# Patient Record
Sex: MALE | Race: WHITE | NOT HISPANIC OR LATINO | Employment: OTHER | URBAN - METROPOLITAN AREA
[De-identification: names, ages, dates, MRNs, and addresses within clinical notes are randomized per-mention and may not be internally consistent; named-entity substitution may affect disease eponyms.]

---

## 2017-05-04 ENCOUNTER — HOSPITAL ENCOUNTER (EMERGENCY)
Facility: HOSPITAL | Age: 67
Discharge: HOME | End: 2017-05-04
Attending: EMERGENCY MEDICINE | Admitting: EMERGENCY MEDICINE
Payer: MEDICARE

## 2017-05-04 ENCOUNTER — APPOINTMENT (EMERGENCY)
Dept: RADIOLOGY | Facility: HOSPITAL | Age: 67
End: 2017-05-04
Payer: MEDICARE

## 2017-05-04 VITALS
OXYGEN SATURATION: 98 % | HEART RATE: 61 BPM | RESPIRATION RATE: 14 BRPM | SYSTOLIC BLOOD PRESSURE: 150 MMHG | TEMPERATURE: 97.6 F | WEIGHT: 190 LBS | DIASTOLIC BLOOD PRESSURE: 91 MMHG | BODY MASS INDEX: 28.79 KG/M2 | HEIGHT: 68 IN

## 2017-05-04 DIAGNOSIS — R07.9 NONSPECIFIC CHEST PAIN: Primary | ICD-10-CM

## 2017-05-04 DIAGNOSIS — K29.70 GASTRITIS: ICD-10-CM

## 2017-05-04 LAB
ALBUMIN SERPL BCP-MCNC: 3.9 G/DL (ref 3.5–5)
ALP SERPL-CCNC: 68 U/L (ref 46–116)
ALT SERPL W P-5'-P-CCNC: 43 U/L (ref 12–78)
ANION GAP SERPL CALCULATED.3IONS-SCNC: 10 MMOL/L (ref 4–13)
APTT PPP: 32 SECONDS (ref 23–35)
AST SERPL W P-5'-P-CCNC: 18 U/L (ref 5–45)
ATRIAL RATE: 72 BPM
BASOPHILS # BLD AUTO: 0.03 THOUSANDS/ΜL (ref 0–0.1)
BASOPHILS NFR BLD AUTO: 0 % (ref 0–1)
BILIRUB SERPL-MCNC: 0.4 MG/DL (ref 0.2–1)
BUN SERPL-MCNC: 26 MG/DL (ref 5–25)
CALCIUM SERPL-MCNC: 9 MG/DL (ref 8.3–10.1)
CHLORIDE SERPL-SCNC: 106 MMOL/L (ref 100–108)
CO2 SERPL-SCNC: 27 MMOL/L (ref 21–32)
CREAT SERPL-MCNC: 0.89 MG/DL (ref 0.6–1.3)
EOSINOPHIL # BLD AUTO: 0.18 THOUSAND/ΜL (ref 0–0.61)
EOSINOPHIL NFR BLD AUTO: 2 % (ref 0–6)
ERYTHROCYTE [DISTWIDTH] IN BLOOD BY AUTOMATED COUNT: 12.5 % (ref 11.6–15.1)
GFR SERPL CREATININE-BSD FRML MDRD: >60 ML/MIN/1.73SQ M
GLUCOSE SERPL-MCNC: 122 MG/DL (ref 65–140)
HCT VFR BLD AUTO: 43.2 % (ref 36.5–49.3)
HGB BLD-MCNC: 14.6 G/DL (ref 12–17)
INR PPP: 0.99 (ref 0.86–1.16)
LIPASE SERPL-CCNC: 115 U/L (ref 73–393)
LYMPHOCYTES # BLD AUTO: 1.72 THOUSANDS/ΜL (ref 0.6–4.47)
LYMPHOCYTES NFR BLD AUTO: 21 % (ref 14–44)
MCH RBC QN AUTO: 29.9 PG (ref 26.8–34.3)
MCHC RBC AUTO-ENTMCNC: 33.8 G/DL (ref 31.4–37.4)
MCV RBC AUTO: 89 FL (ref 82–98)
MONOCYTES # BLD AUTO: 0.64 THOUSAND/ΜL (ref 0.17–1.22)
MONOCYTES NFR BLD AUTO: 8 % (ref 4–12)
NEUTROPHILS # BLD AUTO: 5.77 THOUSANDS/ΜL (ref 1.85–7.62)
NEUTS SEG NFR BLD AUTO: 69 % (ref 43–75)
NRBC BLD AUTO-RTO: 0 /100 WBCS
P AXIS: 62 DEGREES
PLATELET # BLD AUTO: 242 THOUSANDS/UL (ref 149–390)
PMV BLD AUTO: 10 FL (ref 8.9–12.7)
POTASSIUM SERPL-SCNC: 3.6 MMOL/L (ref 3.5–5.3)
PR INTERVAL: 190 MS
PROT SERPL-MCNC: 7 G/DL (ref 6.4–8.2)
PROTHROMBIN TIME: 13.3 SECONDS (ref 12.1–14.4)
QRS AXIS: 36 DEGREES
QRSD INTERVAL: 88 MS
QT INTERVAL: 396 MS
QTC INTERVAL: 433 MS
RBC # BLD AUTO: 4.88 MILLION/UL (ref 3.88–5.62)
SODIUM SERPL-SCNC: 143 MMOL/L (ref 136–145)
SPECIMEN SOURCE: NORMAL
SPECIMEN SOURCE: NORMAL
T WAVE AXIS: 46 DEGREES
TROPONIN I BLD-MCNC: 0 NG/ML (ref 0–0.08)
TROPONIN I BLD-MCNC: 0.01 NG/ML (ref 0–0.08)
VENTRICULAR RATE: 72 BPM
WBC # BLD AUTO: 8.36 THOUSAND/UL (ref 4.31–10.16)

## 2017-05-04 PROCEDURE — 36415 COLL VENOUS BLD VENIPUNCTURE: CPT | Performed by: EMERGENCY MEDICINE

## 2017-05-04 PROCEDURE — 83690 ASSAY OF LIPASE: CPT | Performed by: EMERGENCY MEDICINE

## 2017-05-04 PROCEDURE — 85730 THROMBOPLASTIN TIME PARTIAL: CPT | Performed by: EMERGENCY MEDICINE

## 2017-05-04 PROCEDURE — 71020 HB CHEST X-RAY 2VW FRONTAL&LATL: CPT

## 2017-05-04 PROCEDURE — 85025 COMPLETE CBC W/AUTO DIFF WBC: CPT | Performed by: EMERGENCY MEDICINE

## 2017-05-04 PROCEDURE — 85610 PROTHROMBIN TIME: CPT | Performed by: EMERGENCY MEDICINE

## 2017-05-04 PROCEDURE — 99285 EMERGENCY DEPT VISIT HI MDM: CPT

## 2017-05-04 PROCEDURE — 84484 ASSAY OF TROPONIN QUANT: CPT

## 2017-05-04 PROCEDURE — 96374 THER/PROPH/DIAG INJ IV PUSH: CPT

## 2017-05-04 PROCEDURE — 96361 HYDRATE IV INFUSION ADD-ON: CPT

## 2017-05-04 PROCEDURE — 93005 ELECTROCARDIOGRAM TRACING: CPT | Performed by: EMERGENCY MEDICINE

## 2017-05-04 PROCEDURE — 80053 COMPREHEN METABOLIC PANEL: CPT | Performed by: EMERGENCY MEDICINE

## 2017-05-04 RX ORDER — OMEPRAZOLE 20 MG/1
20 CAPSULE, DELAYED RELEASE ORAL DAILY
Qty: 30 CAPSULE | Refills: 0 | Status: SHIPPED | OUTPATIENT
Start: 2017-05-04 | End: 2018-02-14 | Stop reason: ALTCHOICE

## 2017-05-04 RX ORDER — ATORVASTATIN CALCIUM 20 MG/1
20 TABLET, FILM COATED ORAL DAILY
COMMUNITY

## 2017-05-04 RX ORDER — DICLOFENAC SODIUM 75 MG/1
75 TABLET, DELAYED RELEASE ORAL 2 TIMES DAILY
COMMUNITY

## 2017-05-04 RX ADMIN — SODIUM CHLORIDE 500 ML: 0.9 INJECTION, SOLUTION INTRAVENOUS at 08:16

## 2017-05-04 RX ADMIN — FAMOTIDINE 20 MG: 10 INJECTION, SOLUTION INTRAVENOUS at 08:16

## 2017-05-30 ENCOUNTER — HOSPITAL ENCOUNTER (EMERGENCY)
Facility: HOSPITAL | Age: 67
Discharge: HOME | End: 2017-05-30
Attending: EMERGENCY MEDICINE | Admitting: EMERGENCY MEDICINE
Payer: MEDICARE

## 2017-05-30 VITALS
SYSTOLIC BLOOD PRESSURE: 186 MMHG | BODY MASS INDEX: 28.79 KG/M2 | TEMPERATURE: 98 F | RESPIRATION RATE: 18 BRPM | OXYGEN SATURATION: 99 % | HEART RATE: 69 BPM | HEIGHT: 68 IN | DIASTOLIC BLOOD PRESSURE: 93 MMHG | WEIGHT: 190 LBS

## 2017-05-30 DIAGNOSIS — L03.039: Primary | ICD-10-CM

## 2017-05-30 PROCEDURE — 99283 EMERGENCY DEPT VISIT LOW MDM: CPT

## 2017-05-30 RX ORDER — CEPHALEXIN 500 MG/1
500 CAPSULE ORAL 3 TIMES DAILY
Qty: 30 CAPSULE | Refills: 0 | Status: SHIPPED | OUTPATIENT
Start: 2017-05-30 | End: 2017-06-09

## 2017-06-22 ENCOUNTER — ALLSCRIPTS OFFICE VISIT (OUTPATIENT)
Dept: OTHER | Facility: OTHER | Age: 67
End: 2017-06-22

## 2017-08-01 RX ORDER — ASPIRIN 81 MG/1
81 TABLET ORAL DAILY
COMMUNITY
End: 2020-11-10

## 2017-08-06 ENCOUNTER — ANESTHESIA EVENT (OUTPATIENT)
Dept: PERIOP | Facility: HOSPITAL | Age: 67
End: 2017-08-06
Payer: MEDICARE

## 2017-08-07 ENCOUNTER — GENERIC CONVERSION - ENCOUNTER (OUTPATIENT)
Dept: OTHER | Facility: OTHER | Age: 67
End: 2017-08-07

## 2017-08-07 ENCOUNTER — HOSPITAL ENCOUNTER (OUTPATIENT)
Facility: HOSPITAL | Age: 67
Setting detail: OUTPATIENT SURGERY
Discharge: HOME | End: 2017-08-07
Attending: INTERNAL MEDICINE | Admitting: INTERNAL MEDICINE
Payer: MEDICARE

## 2017-08-07 ENCOUNTER — ANESTHESIA (OUTPATIENT)
Dept: PERIOP | Facility: HOSPITAL | Age: 67
End: 2017-08-07
Payer: MEDICARE

## 2017-08-07 VITALS
SYSTOLIC BLOOD PRESSURE: 139 MMHG | OXYGEN SATURATION: 94 % | RESPIRATION RATE: 13 BRPM | DIASTOLIC BLOOD PRESSURE: 85 MMHG | HEART RATE: 67 BPM | TEMPERATURE: 97.9 F

## 2017-08-07 DIAGNOSIS — K58.0 IRRITABLE BOWEL SYNDROME WITH DIARRHEA: ICD-10-CM

## 2017-08-07 DIAGNOSIS — K21.9 CHRONIC GERD: ICD-10-CM

## 2017-08-07 DIAGNOSIS — R09.89 GLOBUS SENSATION: ICD-10-CM

## 2017-08-07 DIAGNOSIS — R07.89 ATYPICAL CHEST PAIN: ICD-10-CM

## 2017-08-07 DIAGNOSIS — K52.9 CHRONIC DIARRHEA: ICD-10-CM

## 2017-08-07 PROCEDURE — 88305 TISSUE EXAM BY PATHOLOGIST: CPT | Performed by: INTERNAL MEDICINE

## 2017-08-07 RX ORDER — SODIUM CHLORIDE, SODIUM LACTATE, POTASSIUM CHLORIDE, CALCIUM CHLORIDE 600; 310; 30; 20 MG/100ML; MG/100ML; MG/100ML; MG/100ML
125 INJECTION, SOLUTION INTRAVENOUS CONTINUOUS
Status: DISCONTINUED | OUTPATIENT
Start: 2017-08-07 | End: 2017-08-07 | Stop reason: HOSPADM

## 2017-08-07 RX ORDER — PROPOFOL 10 MG/ML
INJECTION, EMULSION INTRAVENOUS AS NEEDED
Status: DISCONTINUED | OUTPATIENT
Start: 2017-08-07 | End: 2017-08-07 | Stop reason: SURG

## 2017-08-07 RX ADMIN — PROPOFOL 50 MG: 10 INJECTION, EMULSION INTRAVENOUS at 09:02

## 2017-08-07 RX ADMIN — SODIUM CHLORIDE, SODIUM LACTATE, POTASSIUM CHLORIDE, AND CALCIUM CHLORIDE 125 ML/HR: .6; .31; .03; .02 INJECTION, SOLUTION INTRAVENOUS at 08:11

## 2017-08-07 RX ADMIN — PROPOFOL 150 MG: 10 INJECTION, EMULSION INTRAVENOUS at 08:59

## 2017-08-07 RX ADMIN — PROPOFOL 50 MG: 10 INJECTION, EMULSION INTRAVENOUS at 09:05

## 2017-08-07 RX ADMIN — SODIUM CHLORIDE, SODIUM LACTATE, POTASSIUM CHLORIDE, AND CALCIUM CHLORIDE: .6; .31; .03; .02 INJECTION, SOLUTION INTRAVENOUS at 08:38

## 2017-08-07 RX ADMIN — PROPOFOL 50 MG: 10 INJECTION, EMULSION INTRAVENOUS at 09:10

## 2018-01-05 ENCOUNTER — HOSPITAL ENCOUNTER (EMERGENCY)
Facility: HOSPITAL | Age: 68
Discharge: HOME | End: 2018-01-05
Admitting: NURSE PRACTITIONER
Payer: MEDICARE

## 2018-01-05 VITALS
OXYGEN SATURATION: 98 % | BODY MASS INDEX: 31.14 KG/M2 | DIASTOLIC BLOOD PRESSURE: 90 MMHG | HEIGHT: 67 IN | WEIGHT: 198.41 LBS | HEART RATE: 70 BPM | RESPIRATION RATE: 18 BRPM | SYSTOLIC BLOOD PRESSURE: 195 MMHG | TEMPERATURE: 97.9 F

## 2018-01-05 DIAGNOSIS — R04.0 MILD EPISTAXIS: Primary | ICD-10-CM

## 2018-01-05 PROCEDURE — 99283 EMERGENCY DEPT VISIT LOW MDM: CPT

## 2018-01-05 RX ADMIN — PHENYLEPHRINE HYDROCHLORIDE 2 DROP: 1 SPRAY NASAL at 12:13

## 2018-01-05 RX ADMIN — SILVER NITRATE APPLICATORS 2 APPLICATOR: 25; 75 STICK TOPICAL at 12:13

## 2018-01-05 NOTE — DISCHARGE INSTRUCTIONS
Epistaxis   WHAT YOU SHOULD KNOW:   Epistaxis is a nosebleed  A nosebleed occurs when the blood vessels near the surface of the nasal cavity are injured or damaged  AFTER YOU LEAVE:   Medicines:   · Nasal sprays:  Vasoconstrictor nasal spray is a medicine that helps make nasal blood vessels narrower  This limits the blood flow and stops the bleeding  This medicine also decreases the swelling inside your nose and helps you breathe easier  You may also be directed to use saline or other nasal sprays to add moisture to your nose  · Antibiotics: This medicine is given to help treat or prevent an infection caused by bacteria  · Take your medicine as directed  Call your healthcare provider if you think your medicine is not helping or if you have side effects  Tell him if you are allergic to any medicine  Keep a list of the medicines, vitamins, and herbs you take  Include the amounts, and when and why you take them  Bring the list or the pill bottles to follow-up visits  Carry your medicine list with you in case of an emergency  Follow up with your primary healthcare provider or otolaryngologist within 2 to 3 days or as directed: Any packing in your nose should be removed within 2 to 3 days  Write down your questions so you remember to ask them during your visits  First aid:   · Sit up and lean forward: This will help prevent you from swallowing blood  Spit blood and saliva into a bowl  · Apply pressure to your nose:  Use 2 fingers to pinch your nose shut for 10 minutes  This will help stop the bleeding  Breathe through your mouth  · Apply ice:  Use a cold pack or put crushed ice in a bag, cover with a towel, and place on the bridge of your nose  · Nasal packing:  Pack your nose with a cotton ball, tissue, tampon, or gauze bandage to stop the bleeding  Prevent epistaxis:  · Avoid nose picking and blowing your nose too hard: You can irritate or damage your nose if you pick it   Blowing your nose too hard may cause the bleeding to start again  · Avoid irritants:  Substances that can irritate your nose should be avoided  These include tobacco smoke and chemical sprays such as  that contain ammonia  · Use a cool mist humidifier in your home: This will add the moisture to the air and help keep your nose moist      · Put a small amount of petroleum jelly inside your nostrils: You may apply a small amount of petroleum jelly if you do not have a nasal packing  This will help keep your nose from drying out or getting irritated  Do not put anything else inside your nose unless your primary healthcare provider tells you to do so  Contact your primary healthcare provider or otolaryngologist if:   · You have a fever and are vomiting  · You have pain in and around your nose that is getting worse even after you take pain medicines  · Your nasal pack is loose  · You have questions or concerns about your condition or care  Seek care immediately if:   · Your nasal packing is soaked with blood  · Your nose is still bleeding after 20 minutes, even after you pinch it  · You have a foul-smelling discharge coming out of your nose  · You feel so weak and dizzy that you have trouble standing up  · You have trouble breathing or talking  © 2014 [address] [employer] is for End User's use only and may not be sold, redistributed or otherwise used for commercial purposes  All illustrations and images included in CareNotes® are the copyrighted property of A D [employer] M [employer]  or Shane Jamil  The above information is an  only  It is not intended as medical advice for individual conditions or treatments  Talk to your doctor, nurse or pharmacist before following any medical regimen to see if it is safe and effective for you

## 2018-01-05 NOTE — ED PROVIDER NOTES
History  Chief Complaint   Patient presents with    Nose Bleed     Pt c/o nose bleeds on and off for about 2 wks  26-year-old male patient presenting here with a chief complaint of recurrent nose bleeds of the right Sary Code  The patient was here recently and had cautery performed but it seems that it has Re bleed since that time  Currently there is no longer bleeding but the patient is requesting intervention  Prior to Admission Medications   Prescriptions Last Dose Informant Patient Reported? Taking?   aspirin (ECOTRIN LOW STRENGTH) 81 mg EC tablet   Yes No   Sig: Take 81 mg by mouth daily   atorvastatin (LIPITOR) 20 mg tablet   Yes No   Sig: Take 20 mg by mouth daily   diclofenac (VOLTAREN) 75 mg EC tablet   Yes No   Sig: Take 75 mg by mouth 2 (two) times a day   omeprazole (PriLOSEC) 20 mg delayed release capsule   No No   Sig: Take 1 capsule by mouth daily   timolol (BETIMOL) 0 25 % ophthalmic solution   Yes No   Si-2 drops 2 (two) times a day      Facility-Administered Medications: None       Past Medical History:   Diagnosis Date    Arthritis     GERD (gastroesophageal reflux disease)     Glaucoma     Hyperlipidemia        Past Surgical History:   Procedure Laterality Date    COLONOSCOPY      NASAL SEPTUM SURGERY      [place] ESOPHAGOGASTRODUODENOSCOPY TRANSORAL DIAGNOSTIC N/A 2017    Procedure: EGD AND COLONOSCOPY;  Surgeon: Lisa Lynne MD;  Location: [facility]; Service: Gastroenterology    TONSILLECTOMY         History reviewed  No pertinent family history  I have reviewed and agree with the history as documented  Social History   Substance Use Topics    Smoking status: Former Smoker     Quit date:     Smokeless tobacco: Never Used    Alcohol use Yes      Comment: rare        Review of Systems   Constitutional: Negative for diaphoresis, fatigue and fever  HENT: Positive for nosebleeds  Negative for congestion, ear pain and sore throat      Eyes: Negative for photophobia, pain, discharge and visual disturbance  Respiratory: Negative for cough, choking, chest tightness, shortness of breath and wheezing  Cardiovascular: Negative for chest pain and palpitations  Gastrointestinal: Negative for abdominal distention, abdominal pain, diarrhea and vomiting  Genitourinary: Negative for dysuria, flank pain and frequency  Musculoskeletal: Negative for back pain, gait problem and joint swelling  Skin: Negative for color change and rash  Neurological: Negative for dizziness, syncope and headaches  Psychiatric/Behavioral: Negative for behavioral problems and confusion  The patient is not nervous/anxious  All other systems reviewed and are negative  Physical Exam  ED Triage Vitals   Temperature Pulse Respirations Blood Pressure SpO2   01/05/18 1111 01/05/18 1111 01/05/18 1111 01/05/18 1111 01/05/18 1111   97 9 °F (36 6 °C) 72 19 (!) 180/104 98 %      Temp Source Heart Rate Source Patient Position - Orthostatic VS BP Location FiO2 (%)   01/05/18 1111 01/05/18 1111 01/05/18 1111 01/05/18 1230 --   Oral Monitor Lying Left arm       Pain Score       01/05/18 1111       No Pain           Orthostatic Vital Signs  Vitals:    01/05/18 1111 01/05/18 1230   BP: (!) 180/104 (!) 195/90   Pulse: 72 70   Patient Position - Orthostatic VS: Lying Sitting       Physical Exam   Constitutional: He is oriented to person, place, and time  He appears well-developed and well-nourished  HENT:   Head: Normocephalic and atraumatic  The nasal mucosa is frail and friable over the medial anterior right nare   Eyes: Pupils are equal, round, and reactive to light  Neck: Normal range of motion  Neck supple  Cardiovascular: Normal rate, regular rhythm, normal heart sounds and normal pulses  PMI is not displaced  Pulmonary/Chest: Effort normal and breath sounds normal  No respiratory distress  Abdominal: Soft  He exhibits no distension  There is no guarding     Musculoskeletal: Normal range of motion  Lymphadenopathy:     He has no cervical adenopathy  Neurological: He is alert and oriented to person, place, and time  Skin: Skin is warm and dry  No rash noted  He is not diaphoretic  No pallor  Psychiatric: He has a normal mood and affect  Vitals reviewed  ED Medications  Medications   phenylephrine (GAYE-SYNEPHRINE) 1 % nasal solution 2 drop (2 drops Each Nare Given 1/5/18 1213)   silver nitrate-potassium nitrate (ARZOL SILVER NITRATE) 82-47 % applicator 2 applicator (2 applicators Topical Given 1/5/18 1213)       Diagnostic Studies  Results Reviewed     None                 No orders to display              Procedures  Epistaxis Mgmt  Date/Time: 1/5/2018 12:43 PM  Performed by: Blair Owens  Authorized by: Blair Owens     Patient location:  ED  Consent:     Consent obtained:  Verbal    Consent given by:  Patient    Risks discussed:  Bleeding, infection and pain    Alternatives discussed:  No treatment  Universal protocol:     Patient identity confirmed:  Verbally with patient  Anesthesia (see MAR for exact dosages): Anesthesia method:  None  Procedure details:     Treatment site:  R anterior    Hemostasis method:  Cautery    Approach:  External    Spec Headlamp used: Yes      Treatment complexity:  Simple  Post-procedure details:     Assessment:  Bleeding stopped    Patient tolerance of procedure:   Tolerated well, no immediate complications             Phone Contacts  ED Phone Contact    ED Course  ED Course                                MDM  Number of Diagnoses or Management Options  Mild epistaxis: new and requires workup    CritCare Time    Disposition  Final diagnoses:   Mild epistaxis     Time reflects when diagnosis was documented in both MDM as applicable and the Disposition within this note     Time User Action Codes Description Comment    1/5/2018 12:02 PM Lorie August Add [R04 0] Mild epistaxis       ED Disposition     ED Disposition Condition Comment Discharge  Lorraine Bryson discharge to home/self care  Condition at discharge: Good        Follow-up Information     Follow up With Specialties Details Why [id number] [facility]  Schedule an appointment as soon as possible for a visit For Continued Evaluation [id number] [facility]        Discharge Medication List as of 1/5/2018 12:03 PM      CONTINUE these medications which have NOT CHANGED    Details   aspirin (ECOTRIN LOW STRENGTH) 81 mg EC tablet Take 81 mg by mouth daily, Historical Med      atorvastatin (LIPITOR) 20 mg tablet Take 20 mg by mouth daily, Until Discontinued, Historical Med      diclofenac (VOLTAREN) 75 mg EC tablet Take 75 mg by mouth 2 (two) times a day, Until Discontinued, Historical Med      omeprazole (PriLOSEC) 20 mg delayed release capsule Take 1 capsule by mouth daily, Starting 5/4/2017, Until Discontinued, Print      timolol (BETIMOL) 0 25 % ophthalmic solution 1-2 drops 2 (two) times a day, Historical Med           No discharge procedures on file      ED Provider  Electronically Signed by           SALONI Burgos  01/06/18 0429

## 2018-01-13 VITALS
SYSTOLIC BLOOD PRESSURE: 140 MMHG | HEART RATE: 76 BPM | HEIGHT: 68 IN | WEIGHT: 190 LBS | DIASTOLIC BLOOD PRESSURE: 90 MMHG | BODY MASS INDEX: 28.79 KG/M2

## 2018-02-11 ENCOUNTER — APPOINTMENT (EMERGENCY)
Dept: RADIOLOGY | Facility: HOSPITAL | Age: 68
End: 2018-02-11
Payer: MEDICARE

## 2018-02-11 ENCOUNTER — HOSPITAL ENCOUNTER (EMERGENCY)
Facility: HOSPITAL | Age: 68
Discharge: HOME | End: 2018-02-11
Attending: EMERGENCY MEDICINE
Payer: MEDICARE

## 2018-02-11 VITALS
BODY MASS INDEX: 29.76 KG/M2 | WEIGHT: 190 LBS | HEART RATE: 69 BPM | RESPIRATION RATE: 20 BRPM | TEMPERATURE: 97.6 F | OXYGEN SATURATION: 97 % | SYSTOLIC BLOOD PRESSURE: 181 MMHG | DIASTOLIC BLOOD PRESSURE: 81 MMHG

## 2018-02-11 DIAGNOSIS — E83.39 HYPOPHOSPHATEMIA: ICD-10-CM

## 2018-02-11 DIAGNOSIS — I49.3 PVC (PREMATURE VENTRICULAR CONTRACTION): Primary | ICD-10-CM

## 2018-02-11 DIAGNOSIS — E87.6 HYPOKALEMIA: ICD-10-CM

## 2018-02-11 LAB
ALBUMIN SERPL BCP-MCNC: 3.8 G/DL (ref 3.5–5)
ALP SERPL-CCNC: 68 U/L (ref 46–116)
ALT SERPL W P-5'-P-CCNC: 44 U/L (ref 12–78)
ANION GAP SERPL CALCULATED.3IONS-SCNC: 9 MMOL/L (ref 4–13)
AST SERPL W P-5'-P-CCNC: 19 U/L (ref 5–45)
BASOPHILS # BLD AUTO: 0.03 THOUSANDS/ΜL (ref 0–0.1)
BASOPHILS NFR BLD AUTO: 0 % (ref 0–1)
BILIRUB SERPL-MCNC: 0.4 MG/DL (ref 0.2–1)
BUN SERPL-MCNC: 20 MG/DL (ref 5–25)
CALCIUM SERPL-MCNC: 8.8 MG/DL (ref 8.3–10.1)
CHLORIDE SERPL-SCNC: 107 MMOL/L (ref 100–108)
CO2 SERPL-SCNC: 26 MMOL/L (ref 21–32)
CREAT SERPL-MCNC: 0.77 MG/DL (ref 0.6–1.3)
DEPRECATED D DIMER PPP: 286 NG/ML (FEU) (ref 0–424)
EOSINOPHIL # BLD AUTO: 0.12 THOUSAND/ΜL (ref 0–0.61)
EOSINOPHIL NFR BLD AUTO: 2 % (ref 0–6)
ERYTHROCYTE [DISTWIDTH] IN BLOOD BY AUTOMATED COUNT: 12.8 % (ref 11.6–15.1)
GFR SERPL CREATININE-BSD FRML MDRD: 93 ML/MIN/1.73SQ M
GLUCOSE SERPL-MCNC: 133 MG/DL (ref 65–140)
HCT VFR BLD AUTO: 45.1 % (ref 36.5–49.3)
HGB BLD-MCNC: 15.1 G/DL (ref 12–17)
LYMPHOCYTES # BLD AUTO: 1.77 THOUSANDS/ΜL (ref 0.6–4.47)
LYMPHOCYTES NFR BLD AUTO: 25 % (ref 14–44)
MAGNESIUM SERPL-MCNC: 2 MG/DL (ref 1.6–2.6)
MCH RBC QN AUTO: 29.3 PG (ref 26.8–34.3)
MCHC RBC AUTO-ENTMCNC: 33.5 G/DL (ref 31.4–37.4)
MCV RBC AUTO: 88 FL (ref 82–98)
MONOCYTES # BLD AUTO: 0.52 THOUSAND/ΜL (ref 0.17–1.22)
MONOCYTES NFR BLD AUTO: 7 % (ref 4–12)
NEUTROPHILS # BLD AUTO: 4.68 THOUSANDS/ΜL (ref 1.85–7.62)
NEUTS SEG NFR BLD AUTO: 66 % (ref 43–75)
NRBC BLD AUTO-RTO: 0 /100 WBCS
PHOSPHATE SERPL-MCNC: 2.2 MG/DL (ref 2.3–4.1)
PLATELET # BLD AUTO: 263 THOUSANDS/UL (ref 149–390)
PMV BLD AUTO: 10.4 FL (ref 8.9–12.7)
POTASSIUM SERPL-SCNC: 3.4 MMOL/L (ref 3.5–5.3)
PROT SERPL-MCNC: 7 G/DL (ref 6.4–8.2)
RBC # BLD AUTO: 5.15 MILLION/UL (ref 3.88–5.62)
SODIUM SERPL-SCNC: 142 MMOL/L (ref 136–145)
TROPONIN I SERPL-MCNC: <0.02 NG/ML
WBC # BLD AUTO: 7.14 THOUSAND/UL (ref 4.31–10.16)

## 2018-02-11 PROCEDURE — 93005 ELECTROCARDIOGRAM TRACING: CPT

## 2018-02-11 PROCEDURE — 84100 ASSAY OF PHOSPHORUS: CPT | Performed by: EMERGENCY MEDICINE

## 2018-02-11 PROCEDURE — 80053 COMPREHEN METABOLIC PANEL: CPT | Performed by: EMERGENCY MEDICINE

## 2018-02-11 PROCEDURE — 85379 FIBRIN DEGRADATION QUANT: CPT | Performed by: EMERGENCY MEDICINE

## 2018-02-11 PROCEDURE — 85025 COMPLETE CBC W/AUTO DIFF WBC: CPT | Performed by: EMERGENCY MEDICINE

## 2018-02-11 PROCEDURE — 83735 ASSAY OF MAGNESIUM: CPT | Performed by: EMERGENCY MEDICINE

## 2018-02-11 PROCEDURE — 99285 EMERGENCY DEPT VISIT HI MDM: CPT

## 2018-02-11 PROCEDURE — 71046 X-RAY EXAM CHEST 2 VIEWS: CPT

## 2018-02-11 PROCEDURE — 84484 ASSAY OF TROPONIN QUANT: CPT | Performed by: EMERGENCY MEDICINE

## 2018-02-11 PROCEDURE — 36415 COLL VENOUS BLD VENIPUNCTURE: CPT | Performed by: EMERGENCY MEDICINE

## 2018-02-11 RX ORDER — POTASSIUM CHLORIDE 20 MEQ/1
40 TABLET, EXTENDED RELEASE ORAL ONCE
Status: COMPLETED | OUTPATIENT
Start: 2018-02-11 | End: 2018-02-11

## 2018-02-11 RX ORDER — ASPIRIN 325 MG
325 TABLET ORAL ONCE
Status: COMPLETED | OUTPATIENT
Start: 2018-02-11 | End: 2018-02-11

## 2018-02-11 RX ADMIN — POTASSIUM CHLORIDE 40 MEQ: 1500 TABLET, EXTENDED RELEASE ORAL at 19:58

## 2018-02-11 RX ADMIN — ASPIRIN 325 MG: 325 TABLET ORAL at 18:32

## 2018-02-11 NOTE — ED PROVIDER NOTES
History  Chief Complaint   Patient presents with    Chest Pain     pt states that he has had chest pain since last night  c/o "missing beats" in his heart and a "fluttering" feeling  also c/o feeling light-headed  Patient is a 68-year-old male  He has no history of coronary artery disease  He has no cardiac risk factors  He has had chest pain for a long time  Two years ago he underwent cardiac catheterization which was negative  His chest pain was subsequently felt to be due to GERD  He was placed on Prilosec  About 3 months ago seen in the emergency room for chest pain and discharged  He returns here today complaining of chest pain and a fluttering feeling in his chest   Yesterday he had some chest pain  He attributed to shoveling some ice  Today the pain persists  It is a mild nonspecific pain  It is substernal   I reports it is more of a fluttering feeling in his chest   The only recent he came in is he took his blood pressure with an automatic cuff and noticed that he was skipping beats  He is not short of breath  He does have some pain with deep breath  No nausea or diaphoresis  No hemoptysis or unilateral leg swelling  No cough  No fever or chills  Patient continues to have symptoms at this time  Again a started yesterday  Prior to Admission Medications   Prescriptions Last Dose Informant Patient Reported?  Taking?   aspirin (ECOTRIN LOW STRENGTH) 81 mg EC tablet   Yes No   Sig: Take 81 mg by mouth daily   atorvastatin (LIPITOR) 20 mg tablet   Yes No   Sig: Take 20 mg by mouth daily   diclofenac (VOLTAREN) 75 mg EC tablet   Yes No   Sig: Take 75 mg by mouth 2 (two) times a day   omeprazole (PriLOSEC) 20 mg delayed release capsule   No No   Sig: Take 1 capsule by mouth daily   timolol (BETIMOL) 0 25 % ophthalmic solution   Yes No   Si-2 drops 2 (two) times a day      Facility-Administered Medications: None       Past Medical History:   Diagnosis Date    Arthritis     GERD (gastroesophageal reflux disease)     Glaucoma     Hyperlipidemia     Hypertension        Past Surgical History:   Procedure Laterality Date    COLONOSCOPY      NASAL SEPTUM SURGERY      [place] ESOPHAGOGASTRODUODENOSCOPY TRANSORAL DIAGNOSTIC N/A 8/7/2017    Procedure: EGD AND COLONOSCOPY;  Surgeon: Lety Lopez MD;  Location: [facility]; Service: Gastroenterology    TONSILLECTOMY         History reviewed  No pertinent family history  I have reviewed and agree with the history as documented  Social History   Substance Use Topics    Smoking status: Former Smoker     Quit date: 1989    Smokeless tobacco: Never Used    Alcohol use Yes      Comment: rare        Review of Systems   Constitutional: Negative for chills and fever  HENT: Negative for rhinorrhea and sore throat  Eyes: Negative for pain, redness and visual disturbance  Respiratory: Negative for cough and shortness of breath  Cardiovascular: Positive for chest pain  Negative for leg swelling  Gastrointestinal: Negative for abdominal pain, diarrhea and vomiting  Endocrine: Negative for polydipsia and polyuria  Genitourinary: Negative for dysuria, frequency and hematuria  Musculoskeletal: Negative for back pain and neck pain  Skin: Negative for rash and wound  Allergic/Immunologic: Negative for immunocompromised state  Neurological: Negative for weakness, numbness and headaches  Psychiatric/Behavioral: Negative for hallucinations and suicidal ideas  All other systems reviewed and are negative        Physical Exam  ED Triage Vitals [02/11/18 1737]   Temperature Pulse Respirations Blood Pressure SpO2   97 6 °F (36 4 °C) 78 16 (!) 226/103 96 %      Temp Source Heart Rate Source Patient Position - Orthostatic VS BP Location FiO2 (%)   Oral Monitor Sitting Left arm --      Pain Score       6           Orthostatic Vital Signs  Vitals:    02/11/18 1737 02/11/18 1739 02/11/18 1949   BP: (!) 226/103 164/85 (!) 181/81   Pulse: 78 69   Patient Position - Orthostatic VS: Sitting Sitting Lying       Physical Exam   Constitutional: He is oriented to person, place, and time  He appears well-developed and well-nourished  No distress  HENT:   Head: Normocephalic and atraumatic  Mouth/Throat: Oropharynx is clear and moist    Eyes: Right eye exhibits no discharge  Left eye exhibits no discharge  No scleral icterus  Neck: Normal range of motion  Neck supple  Cardiovascular: Normal rate, normal heart sounds and intact distal pulses  Exam reveals no gallop and no friction rub  No murmur heard  Ectopic beats are present  Pulmonary/Chest: Effort normal and breath sounds normal  No respiratory distress  He has no wheezes  He has no rales  He exhibits no tenderness  Abdominal: Soft  Bowel sounds are normal  He exhibits no distension  There is no tenderness  There is no rebound and no guarding  Musculoskeletal: Normal range of motion  He exhibits no edema, tenderness or deformity  There is no calf pain  Neurological: He is alert and oriented to person, place, and time  He has normal strength  No sensory deficit  GCS eye subscore is 4  GCS verbal subscore is 5  GCS motor subscore is 6  Skin: Skin is warm and dry  No rash noted  He is not diaphoretic  Psychiatric: He has a normal mood and affect  His behavior is normal    Vitals reviewed        ED Medications  Medications   aspirin tablet 325 mg (325 mg Oral Given 2/11/18 1832)   potassium chloride (K-DUR,KLOR-CON) CR tablet 40 mEq (40 mEq Oral Given 2/11/18 1958)       Diagnostic Studies  Results Reviewed     Procedure Component Value Units Date/Time    Comprehensive metabolic panel [[id number]]  (Abnormal) Collected:  02/11/18 1832    Lab Status:  Final result Specimen:  Blood from Arm, Right Updated:  02/11/18 1904     Sodium 142 mmol/L      Potassium 3 4 (L) mmol/L      Chloride 107 mmol/L      CO2 26 mmol/L      Anion Gap 9 mmol/L      BUN 20 mg/dL      Creatinine 0 77 mg/dL Glucose 133 mg/dL      Calcium 8 8 mg/dL      AST 19 U/L      ALT 44 U/L      Alkaline Phosphatase 68 U/L      Total Protein 7 0 g/dL      Albumin 3 8 g/dL      Total Bilirubin 0 40 mg/dL      eGFR 93 ml/min/1 73sq m     Narrative:         National Kidney Disease Education Program recommendations are as follows:  GFR calculation is accurate only with a steady state creatinine  Chronic Kidney disease less than 60 ml/min/1 73 sq  meters  Kidney failure less than 15 ml/min/1 73 sq  meters  Magnesium [[id number]]  (Normal) Collected:  02/11/18 1832    Lab Status:  Final result Specimen:  Blood from Arm, Right Updated:  02/11/18 1904     Magnesium 2 0 mg/dL     Phosphorus [[id number]]  (Abnormal) Collected:  02/11/18 1832    Lab Status:  Final result Specimen:  Blood from Arm, Right Updated:  02/11/18 1904     Phosphorus 2 2 (L) mg/dL     Troponin I [[id number]]  (Normal) Collected:  02/11/18 1833    Lab Status:  Final result Specimen:  Blood from Arm, Right Updated:  02/11/18 1858     Troponin I <0 02 ng/mL     Narrative:         [employer] Chemistry analyzer 99% cutoff is > 0 04 ng/mL in network labs    o cTnI 99% cutoff is useful only when applied to patients in the clinical setting of myocardial ischemia  o cTnI 99% cutoff should be interpreted in the context of clinical history, ECG findings and possibly cardiac imaging to establish correct diagnosis  o cTnI 99% cutoff may be suggestive but clearly not indicative of a coronary event without the clinical setting of myocardial ischemia      D-Dimer [[id number]]  (Normal) Collected:  02/11/18 1833    Lab Status:  Final result Specimen:  Blood from Arm, Right Updated:  02/11/18 1852     D-Dimer, Quant 286 ng/ml (FEU)     CBC and differential [[id number]]  (Normal) Collected:  02/11/18 1832    Lab Status:  Final result Specimen:  Blood from Arm, Right Updated:  02/11/18 1842     WBC 7 14 Thousand/uL      RBC 5 15 Million/uL      Hemoglobin 15 1 g/dL      Hematocrit 45 1 %      MCV 88 fL      MCH 29 3 pg      MCHC 33 5 g/dL      RDW 12 8 %      MPV 10 4 fL      Platelets 301 Thousands/uL      nRBC 0 /100 WBCs      Neutrophils Relative 66 %      Lymphocytes Relative 25 %      Monocytes Relative 7 %      Eosinophils Relative 2 %      Basophils Relative 0 %      Neutrophils Absolute 4 68 Thousands/µL      Lymphocytes Absolute 1 77 Thousands/µL      Monocytes Absolute 0 52 Thousand/µL      Eosinophils Absolute 0 12 Thousand/µL      Basophils Absolute 0 03 Thousands/µL                  XR chest 2 views   ED Interpretation by Fatou Ruvalcaba MD (02/11 1956)   Poor inspiratory study  No infiltrates  Procedures  ECG 12 Lead Documentation  Date/Time: 2/11/2018 6:47 PM  Performed by: Marla Ends by: Roda Bamberger     ECG reviewed by me, the ED Provider: yes    Patient location:  ED  Comments:      Normal sinus rhythm with frequent PVCs  ST and T-waves are normal  Normal axis  Phone Contacts  ED Phone Contact    ED Course  ED Course                                MDM  Number of Diagnoses or Management Options  Diagnosis management comments: Patient is not really having chest pain per se  He is more feeling PVCs  I do not feel this is acute coronary syndrome  EKG is otherwise without ischemia  Troponin is negative  Troponin is negative despite symptoms since yesterday  He has no cardiac risk factors  Two years ago he had a negative catheterization  D-dimer is negative making pulmonary embolism unlikely  No pneumothorax or pneumonia on chest x-ray  Pain would be atypical for dissection  There is no back pain  Patient's potassium and phosphorus was slightly low  Will prescribe replacement  Otherwise patient is appropriate for discharge and follow up with his primary MD and Cardiology as an outpatient         Amount and/or Complexity of Data Reviewed  Clinical lab tests: ordered and reviewed  Tests in the radiology section of CPT®: ordered and reviewed  Independent visualization of images, tracings, or specimens: yes      CritCare Time    Disposition  Final diagnoses:   PVC (premature ventricular contraction)   Hypokalemia   Hypophosphatemia     Time reflects when diagnosis was documented in both MDM as applicable and the Disposition within this note     Time User Action Codes Description Comment    2/11/2018  7:58 PM Chela Uribe Add [I49 3] PVC (premature ventricular contraction)     2/11/2018  7:58 PM Chela Uribe Add [E87 6] Hypokalemia     2/11/2018  7:58 PM Chela Uribe Add [E83 39] Hypophosphatemia       ED Disposition     ED Disposition Condition Comment    [facility]  Condition at discharge: Good        Follow-up Information     Follow up With Specialties Details Why [address], DO General Practice In 2 days  [address] [facility] [address] [place] [address]      Dawna De Leon MD Cardiology In 2 days  [address]/[facility] [address]  [phone]          Discharge Medication List as of 2/11/2018  8:02 PM      START taking these medications    Details   potassium phosphate, monobasic, (K-PHOS) 500 MG tablet Take 1 tablet (500 mg total) by mouth 2 (two) times a day, Starting Sun 2/11/2018, Print         CONTINUE these medications which have NOT CHANGED    Details   aspirin (ECOTRIN LOW STRENGTH) 81 mg EC tablet Take 81 mg by mouth daily, Historical Med      atorvastatin (LIPITOR) 20 mg tablet Take 20 mg by mouth daily, Until Discontinued, Historical Med      diclofenac (VOLTAREN) 75 mg EC tablet Take 75 mg by mouth 2 (two) times a day, Until Discontinued, Historical Med      omeprazole (PriLOSEC) 20 mg delayed release capsule Take 1 capsule by mouth daily, Starting 5/4/2017, Until Discontinued, Print      timolol (BETIMOL) 0 25 % ophthalmic solution 1-2 drops 2 (two) times a day, Historical Med           No discharge procedures on file      ED Provider  Electronically Signed by           Carlin Lane MD  02/11/18 4631

## 2018-02-12 LAB
ATRIAL RATE: 83 BPM
P AXIS: 50 DEGREES
PR INTERVAL: 180 MS
QRS AXIS: 41 DEGREES
QRSD INTERVAL: 86 MS
QT INTERVAL: 360 MS
QTC INTERVAL: 423 MS
T WAVE AXIS: 29 DEGREES
VENTRICULAR RATE: 83 BPM

## 2018-02-12 PROCEDURE — 93010 ELECTROCARDIOGRAM REPORT: CPT | Performed by: INTERNAL MEDICINE

## 2018-02-12 NOTE — DISCHARGE INSTRUCTIONS
Premature Ventricular Contractions   WHAT YOU NEED TO KNOW:   Premature ventricular contractions (PVCs) are an interruption in your heart rhythm  They are caused by an early signal for your heart to pump  Your risk of PVCs increases when you drink alcohol or caffeine, or if you are fatigued or stressed  It is very important for you to follow up with your healthcare provider so the cause of your PVC can be diagnosed and treated  DISCHARGE INSTRUCTIONS:   Follow up with your healthcare provider as directed: You may need another EKG within the first 10 days and more testing for up to 12 months  Write down your questions so you remember to ask them during your visits  Medicines:   · Heart medicine  may be given to make your heart beat at a regular rate and rhythm  · Take your medicine as directed  Contact your healthcare provider if you think your medicine is not helping or if you have side effects  Tell him if you are allergic to any medicine  Keep a list of the medicines, vitamins, and herbs you take  Include the amounts, and when and why you take them  Bring the list or the pill bottles to follow-up visits  Carry your medicine list with you in case of an emergency  Contact your healthcare provider if:   · You still have symptoms after treatment, or your symptoms worsen  · You have questions or concerns about your condition or care  Return to the emergency department if:   · You have any of the following signs of a heart attack:      ¨ Squeezing, pressure, or pain in your chest that lasts longer than 5 minutes or returns    ¨ Discomfort or pain in your back, neck, jaw, stomach, or arm     ¨ Trouble breathing    ¨ Nausea or vomiting    ¨ Lightheadedness or a sudden cold sweat, especially with chest pain or trouble breathing    © 2017 [address] is for End User's use only and may not be sold, redistributed or otherwise used for commercial purposes   All illustrations and images included in [employer] are the copyrighted property of [employer] , [employer]  or Shane Jamil  The above information is an  only  It is not intended as medical advice for individual conditions or treatments  Talk to your doctor, nurse or pharmacist before following any medical regimen to see if it is safe and effective for you

## 2018-02-13 ENCOUNTER — TELEPHONE (OUTPATIENT)
Dept: CARDIOLOGY CLINIC | Facility: CLINIC | Age: 68
End: 2018-02-13

## 2018-02-14 ENCOUNTER — OFFICE VISIT (OUTPATIENT)
Dept: CARDIOLOGY CLINIC | Facility: CLINIC | Age: 68
End: 2018-02-14
Payer: MEDICARE

## 2018-02-14 ENCOUNTER — APPOINTMENT (OUTPATIENT)
Dept: LAB | Facility: HOSPITAL | Age: 68
End: 2018-02-14
Attending: INTERNAL MEDICINE
Payer: MEDICARE

## 2018-02-14 ENCOUNTER — TELEPHONE (OUTPATIENT)
Dept: CARDIOLOGY CLINIC | Facility: CLINIC | Age: 68
End: 2018-02-14

## 2018-02-14 VITALS
OXYGEN SATURATION: 96 % | DIASTOLIC BLOOD PRESSURE: 84 MMHG | HEART RATE: 83 BPM | SYSTOLIC BLOOD PRESSURE: 158 MMHG | HEIGHT: 67 IN | BODY MASS INDEX: 30.64 KG/M2 | WEIGHT: 195.2 LBS

## 2018-02-14 DIAGNOSIS — R07.89 CHEST DISCOMFORT: ICD-10-CM

## 2018-02-14 DIAGNOSIS — E78.2 MIXED HYPERLIPIDEMIA: ICD-10-CM

## 2018-02-14 DIAGNOSIS — I49.3 PVC (PREMATURE VENTRICULAR CONTRACTION): ICD-10-CM

## 2018-02-14 DIAGNOSIS — I10 HYPERTENSION, ESSENTIAL: Primary | ICD-10-CM

## 2018-02-14 DIAGNOSIS — K21.9 GASTROESOPHAGEAL REFLUX DISEASE WITHOUT ESOPHAGITIS: ICD-10-CM

## 2018-02-14 LAB
ALBUMIN SERPL BCP-MCNC: 3.8 G/DL (ref 3.5–5)
ALP SERPL-CCNC: 64 U/L (ref 46–116)
ALT SERPL W P-5'-P-CCNC: 42 U/L (ref 12–78)
ANION GAP SERPL CALCULATED.3IONS-SCNC: 10 MMOL/L (ref 4–13)
AST SERPL W P-5'-P-CCNC: 16 U/L (ref 5–45)
BILIRUB SERPL-MCNC: 0.6 MG/DL (ref 0.2–1)
BUN SERPL-MCNC: 24 MG/DL (ref 5–25)
CALCIUM SERPL-MCNC: 8.9 MG/DL (ref 8.3–10.1)
CHLORIDE SERPL-SCNC: 107 MMOL/L (ref 100–108)
CO2 SERPL-SCNC: 27 MMOL/L (ref 21–32)
CREAT SERPL-MCNC: 0.87 MG/DL (ref 0.6–1.3)
GFR SERPL CREATININE-BSD FRML MDRD: 88 ML/MIN/1.73SQ M
GLUCOSE P FAST SERPL-MCNC: 132 MG/DL (ref 65–99)
MAGNESIUM SERPL-MCNC: 2 MG/DL (ref 1.6–2.6)
POTASSIUM SERPL-SCNC: 3.7 MMOL/L (ref 3.5–5.3)
PROT SERPL-MCNC: 7 G/DL (ref 6.4–8.2)
SODIUM SERPL-SCNC: 144 MMOL/L (ref 136–145)

## 2018-02-14 PROCEDURE — 83735 ASSAY OF MAGNESIUM: CPT

## 2018-02-14 PROCEDURE — 80053 COMPREHEN METABOLIC PANEL: CPT | Performed by: INTERNAL MEDICINE

## 2018-02-14 PROCEDURE — 36415 COLL VENOUS BLD VENIPUNCTURE: CPT | Performed by: INTERNAL MEDICINE

## 2018-02-14 PROCEDURE — 99204 OFFICE O/P NEW MOD 45 MIN: CPT | Performed by: INTERNAL MEDICINE

## 2018-02-14 RX ORDER — LOSARTAN POTASSIUM 25 MG/1
25 TABLET ORAL DAILY
COMMUNITY
End: 2018-02-14

## 2018-02-14 RX ORDER — LOSARTAN POTASSIUM 50 MG/1
25 TABLET ORAL DAILY
Qty: 30 TABLET | Refills: 4 | Status: SHIPPED | OUTPATIENT
Start: 2018-02-14 | End: 2018-07-18 | Stop reason: ALTCHOICE

## 2018-02-14 RX ORDER — PANTOPRAZOLE SODIUM 20 MG/1
20 TABLET, DELAYED RELEASE ORAL DAILY
Qty: 30 TABLET | Refills: 4 | Status: SHIPPED | OUTPATIENT
Start: 2018-02-14 | End: 2019-08-20 | Stop reason: SDDI

## 2018-02-14 NOTE — PROGRESS NOTES
Cardiology Consultation     Pranav Fletcher  [id number]  1950  [place] [address] [place] [id number]    Patient presents For cardiology evaluation  Patient was seen in the emergency room recently with symptoms of atypical chest pain as well as elevated blood pressures  Patient was started on losartan 25 mg daily  Patient does not have any history of coronary artery disease or MI in the past   Patient states that he had a cardiac catheterization a few years ago which was negative for ischemia  Patient also tells that he has had stress test in the past which has been inconclusive or unremarkable  Patient has this constant discomfort in the chest sometimes  This is not related to exertion  Patient states that his blood pressures are much better  Patient denies any history of shortness of breath out of the ordinary  No history of leg edema orthopnea PND  No history of presyncope syncope  He states that he has been compliant with all his present medications  He also has history of gastroesophageal reflux disease and takes over-the-counter Prilosec which has not been helping the patient  Patient also had endoscopy recently a few months ago  1  Gastroesophageal reflux disease without esophagitis  pantoprazole (PROTONIX) 20 mg tablet   2  Hypertension, essential  losartan (COZAAR) 50 mg tablet    Echo complete with contrast if indicated    Comprehensive metabolic panel   3  Chest discomfort  Echo complete with contrast if indicated    Comprehensive metabolic panel   4  PVC (premature ventricular contraction)  Magnesium   5   Mixed hyperlipidemia       Patient Active Problem List   Diagnosis    Hypertension, essential    Chest discomfort    Mixed hyperlipidemia    Gastroesophageal reflux disease without esophagitis         Labs:  Admission on 02/11/2018, Discharged on 02/11/2018   Component Date Value    WBC 02/11/2018 7 14  RBC 02/11/2018 5 15     Hemoglobin 02/11/2018 15 1     Hematocrit 02/11/2018 45 1     MCV 02/11/2018 88     MCH 02/11/2018 29 3     MCHC 02/11/2018 33 5     RDW 02/11/2018 12 8     MPV 02/11/2018 10 4     Platelets 37/83/2363 263     nRBC 02/11/2018 0     Neutrophils Relative 02/11/2018 66     Lymphocytes Relative 02/11/2018 25     Monocytes Relative 02/11/2018 7     Eosinophils Relative 02/11/2018 2     Basophils Relative 02/11/2018 0     Neutrophils Absolute 02/11/2018 4 68     Lymphocytes Absolute 02/11/2018 1 77     Monocytes Absolute 02/11/2018 0 52     Eosinophils Absolute 02/11/2018 0 12     Basophils Absolute 02/11/2018 0 03     Sodium 02/11/2018 142     Potassium 02/11/2018 3 4*    Chloride 02/11/2018 107     CO2 02/11/2018 26     Anion Gap 02/11/2018 9     BUN 02/11/2018 20     Creatinine 02/11/2018 0 77     Glucose 02/11/2018 133     Calcium 02/11/2018 8 8     AST 02/11/2018 19     ALT 02/11/2018 44     Alkaline Phosphatase 02/11/2018 68     Total Protein 02/11/2018 7 0     Albumin 02/11/2018 3 8     Total Bilirubin 02/11/2018 0 40     eGFR 02/11/2018 93     Troponin I 02/11/2018 <0 02     Magnesium 02/11/2018 2 0     Phosphorus 02/11/2018 2 2*    D-Dimer, Quant 02/11/2018 286     Ventricular Rate 02/11/2018 83     Atrial Rate 02/11/2018 83     MI Interval 02/11/2018 180     QRSD Interval 02/11/2018 86     QT Interval 02/11/2018 360     QTC Interval 02/11/2018 423     P Axis 02/11/2018 50     QRS Axis 02/11/2018 41     T Wave Axis 02/11/2018 29      Imaging: Xr Chest 2 Views    Result Date: 2/12/2018  Narrative:   CHEST - DUAL ENERGY INDICATION:  Chest pain COMPARISON:  May 4, 2017 VIEWS:  PA (including soft tissue/bone algorithms) and lateral projections IMAGES:  4 FINDINGS: Cardiomegaly seen The lungs are clear  No pneumothorax or pleural effusion  Visualized osseous structures appear within normal limits for the patient's age       Impression: No acute consolidation or congestion Hypoinflated lungs Cardiomegaly Workstation performed: [id number]       Review of Systems:  Review of Systems   REVIEW OF SYSTEMS:  Constitutional:  Denies fever or chills   Eyes:  Denies change in visual acuity   HENT:  Denies nasal congestion or sore throat   Respiratory:  shortness of breath   Cardiovascular:  Denies chest pain or edema   GI:  GERD  :  Denies dysuria, frequency, difficulty in micturition and nocturia  Musculoskeletal:  Denies back pain or joint pain   Neurologic:  Denies headache, focal weakness or sensory changes   Endocrine:  Denies polyuria or polydipsia   Lymphatic:  Denies swollen glands   Psychiatric:  Denies depression or anxiety     Physical Exam:  Physical Exam   PHYSICAL EXAM:  General:  Patient is not in acute distress   Head: Normocephalic, Atraumatic  HEENT:  Both pupils normal-size atraumatic, normocephalic, nonicteric  Neck:  JVP not raised  Trachea central  No carotid bruit  Respiratory:  normal breath sounds no crackles  no rhonchi  Cardiovascular:  Regular rate and rhythm no S3 no murmurs  GI:  Abdomen soft nontender  No organomegaly  Lymphatic:  No cervical or inguinal lymphadenopathy  Neurologic:  Patient is awake alert, oriented   Grossly nonfocal    Discussion/Summary:  Patient with history of hypertension hyperlipidemia and obesity as well as possible history of reflux disease  Patient has symptoms of chest pain which appeared atypical   Blood pressures are still elevated  In view of this will increase the losartan to 50 mg daily  Importance of salt restriction reinforced with the patient  Patient will be scheduled for an echocardiogram to assess ejection fraction valves and look for any evidence of pulmonary hypertension  Patient will be switched from omeprazole over the counter to pantoprazole to see if that makes a difference to his reflux symptoms    Patient counseled about abuse of NSAIDs which can aggravate his symptoms of reflux  Patient will need a stress test but will wait for his blood pressures to get better  Symptoms to watch out from cardiac standpoint discussed at length with patient  Patient counseled to lose weight to reduce cardiovascular morbidity and mortality  All his medications were reviewed  Patient is agreeable with the plan of care  Patient had hypokalemia and low phosphorus during his ER visit  Will recheck blood work  Patient will continue to follow up with primary care physician  Follow-up in 4 to 6 weeks or earlier as needed  Time 40 minutes

## 2018-07-18 ENCOUNTER — OFFICE VISIT (OUTPATIENT)
Dept: NEUROLOGY | Facility: CLINIC | Age: 68
End: 2018-07-18
Payer: MEDICARE

## 2018-07-18 VITALS
HEART RATE: 70 BPM | BODY MASS INDEX: 30.22 KG/M2 | HEIGHT: 66 IN | DIASTOLIC BLOOD PRESSURE: 82 MMHG | WEIGHT: 188 LBS | SYSTOLIC BLOOD PRESSURE: 120 MMHG

## 2018-07-18 DIAGNOSIS — G89.29 CHRONIC RIGHT-SIDED LOW BACK PAIN WITHOUT SCIATICA: Primary | ICD-10-CM

## 2018-07-18 DIAGNOSIS — M54.50 CHRONIC RIGHT-SIDED LOW BACK PAIN WITHOUT SCIATICA: Primary | ICD-10-CM

## 2018-07-18 PROCEDURE — 99203 OFFICE O/P NEW LOW 30 MIN: CPT | Performed by: PSYCHIATRY & NEUROLOGY

## 2018-07-18 RX ORDER — VERAPAMIL HYDROCHLORIDE 80 MG/1
1 TABLET ORAL 4 TIMES DAILY
COMMUNITY
Start: 2018-07-17

## 2018-07-18 NOTE — PROGRESS NOTES
Stone Jorge is a 69 y o  male  Chief Complaint   Patient presents with    Back Pain     Chronic right sided low back pain       Assessment:  1  Chronic right-sided low back pain without sciatica        Plan:    Discussion:    Discussed with patient different options, physical therapy is aggravated his symptoms, also his last MRI was in 2015, he was advised to repeat the MRI scan of the lumbar spine and call me after that to discuss the results, would recommend him to be seen by pain specialist since he would like to try different pain specialist, other options would be is to see a neurosurgeon if no relief with the pain management, to avoid strenuous activity, to go to the hospital if has any worsening symptoms and call me otherwise to see me backin 2 months and follow up with family physician  Subjective:    HPI   Patient is here for evaluation of chronic right-sided low back pain for more than 10 years, pain is mostly on the right side without any radiation into the legs, no focal weakness, no numbness or tingling sensation, denies any bowel or bladder incontinence, he recently had epidural injections with the pain specialist which given relief for about 2-3 weeks and then he has the pain again which is more or less constant worse with activities and relieved with rest at least about 5-8 on 10,  no other neurological complaints      Vitals:    07/18/18 1301   BP: 120/82   BP Location: Left arm   Patient Position: Sitting   Cuff Size: Adult   Pulse: 70   Weight: 85 3 kg (188 lb)   Height: 5' 5 75" (1 67 m)       Current Medications    Current Outpatient Prescriptions:     atorvastatin (LIPITOR) 20 mg tablet, Take 20 mg by mouth daily, Disp: , Rfl:     diclofenac (VOLTAREN) 75 mg EC tablet, Take 75 mg by mouth 2 (two) times a day, Disp: , Rfl:     pantoprazole (PROTONIX) 20 mg tablet, Take 1 tablet (20 mg total) by mouth daily, Disp: 30 tablet, Rfl: 4    timolol (BETIMOL) 0 25 % ophthalmic solution, 1-2 drops 2 (two) times a day, Disp: , Rfl:     verapamil (CALAN) 80 mg tablet, Take 1 tablet by mouth 4 (four) times a day, Disp: , Rfl:     aspirin (ECOTRIN LOW STRENGTH) 81 mg EC tablet, Take 81 mg by mouth daily, Disp: , Rfl:       Allergies  Patient has no known allergies  Past Medical History  Past Medical History:   Diagnosis Date    Arthritis     CAD (coronary artery disease)     GERD (gastroesophageal reflux disease)     Glaucoma     Hyperlipidemia     Hypertension     IBS (irritable colon syndrome)     NSVT (nonsustained ventricular tachycardia) (HCC)     PVC (premature ventricular contraction)          Past Surgical History:  Past Surgical History:   Procedure Laterality Date    COLONOSCOPY      NASAL SEPTUM SURGERY      [place] ESOPHAGOGASTRODUODENOSCOPY TRANSORAL DIAGNOSTIC N/A 8/7/2017    Procedure: EGD AND COLONOSCOPY;  Surgeon: Luis Mosquera MD;  Location: [facility]; Service: Gastroenterology    TONSILLECTOMY           Family History:  Family History   Problem Relation Age of Onset    Heart disease Mother     Dementia Father     Cancer Father     Heart disease Father        Social History:   reports that he quit smoking about 29 years ago  He has never used smokeless tobacco  He reports that he drinks alcohol  He reports that he does not use drugs  I have reviewed the past medical history, surgical history, social and family history, current medications, allergies vitals, review of systems, and updated this information as appropriate today  Objective:    Physical Exam    Neurological Exam  Patient is alert awake oriented, high functions are intact, speech is fluent  No evidence of any aphasia or dysarthria    Cranial nerve examination reveals visual fields are full to threat, pupils equal and reactive, extraocular movements intact, fundi showed sharp disc margins, sensation in the V1 V2 V3 distribution is symmetric, no obvious facial asymmetry noted, tongue is midline and gag is adequate  Hearing is intact bilaterally, shoulder shrug is intact bilaterally  Motor examination reveals normal tone and bulk, no evidence of any drift to the outstretched extremities, strength is 5/5 preserved bilaterally in both upper and lower extremities, deep tendon reflexes are intact, toes are downgoing  Sensory examination to pinprick light touch proprioception and vibration is preserved bilaterally, patient does not extinguish double simultaneous stimuli  Coordination no evidence of any finger-to-nose dysmetria, no evidence of any dysdiadochokinesia,  Gait is normal based Romberg sign is negative  Paraspinal muscle tenderness in the right side of the lumbar area  ROS:  Review of Systems   Constitutional: Positive for fatigue  Negative for appetite change and fever  HENT: Negative  Negative for hearing loss, tinnitus, trouble swallowing and voice change  Eyes: Negative  Negative for photophobia and pain  Respiratory: Negative  Negative for shortness of breath  Cardiovascular: Negative  Negative for chest pain and palpitations  Gastrointestinal: Negative  Negative for abdominal pain, nausea and vomiting  Endocrine: Negative  Negative for cold intolerance and heat intolerance  Genitourinary: Negative  Negative for dysuria, frequency and urgency  Musculoskeletal: Positive for back pain  Negative for gait problem, myalgias and neck pain  Skin: Negative  Negative for rash  Neurological: Negative  Negative for dizziness, tremors, seizures, syncope, facial asymmetry, speech difficulty, weakness, light-headedness, numbness and headaches  Hematological: Negative  Does not bruise/bleed easily  Psychiatric/Behavioral: Positive for sleep disturbance  Negative for confusion and hallucinations

## 2018-07-27 ENCOUNTER — HOSPITAL ENCOUNTER (OUTPATIENT)
Dept: MRI IMAGING | Facility: CLINIC | Age: 68
Discharge: HOME | End: 2018-07-27
Payer: MEDICARE

## 2018-07-27 DIAGNOSIS — M54.50 CHRONIC RIGHT-SIDED LOW BACK PAIN WITHOUT SCIATICA: ICD-10-CM

## 2018-07-27 DIAGNOSIS — G89.29 CHRONIC RIGHT-SIDED LOW BACK PAIN WITHOUT SCIATICA: ICD-10-CM

## 2018-07-27 PROCEDURE — 72148 MRI LUMBAR SPINE W/O DYE: CPT

## 2018-08-08 ENCOUNTER — CONSULT (OUTPATIENT)
Dept: PAIN MEDICINE | Facility: CLINIC | Age: 68
End: 2018-08-08
Payer: MEDICARE

## 2018-08-08 VITALS
HEART RATE: 91 BPM | WEIGHT: 188 LBS | BODY MASS INDEX: 30.22 KG/M2 | DIASTOLIC BLOOD PRESSURE: 64 MMHG | SYSTOLIC BLOOD PRESSURE: 122 MMHG | HEIGHT: 66 IN | RESPIRATION RATE: 16 BRPM

## 2018-08-08 DIAGNOSIS — M47.816 LUMBAR SPONDYLOSIS: Primary | ICD-10-CM

## 2018-08-08 PROCEDURE — 99204 OFFICE O/P NEW MOD 45 MIN: CPT | Performed by: ANESTHESIOLOGY

## 2018-08-08 NOTE — PROGRESS NOTES
Assessment:  1  Lumbar spondylosis  FL spine and pain procedure       Plan: This is a 75-year-old male who presents today for initial consultation for management of low back pain  On physical examination, there is worsening pain with lumbar spine extension and lateral rotation  This is indicative of positive facet loading  The patient's pain persists despite time, relative rest, activity modification and therapy  Based on the patient's symptoms and examination, I suspect that a component of pain is being generated by the facet joints  The facet joints are only one of several possible axial pain generators  This was reviewed with the patient today  We will move forward with medial branch blockade at levels [right L2-L5] utilizing a double block paradigm  If the patient receives significant relief of appropriate duration with 2% lidocaine, we will confirm with   25% bupivacaine  If the patient demonstrates appropriate response to medial branch blockade we will schedule radiofrequency ablation of the lumbar medial branches to essentially denervate the facet joints  In the office today, we reviewed the nature of the patient's pathology in depth using diagrams and models  We discussed the approach we would take for the medial branch block and provided literature for home review  The patient understands the risks associated with the procedure including bleeding, infection, tissue action, allergic reaction, nerve injury and verbal and written consent was obtained today  Home exercises encouraged  Back brace ordered through [employer]    My impressions and treatment recommendations were discussed in detail with the patient who verbalized understanding and had no further questions  Discharge instructions were provided  I personally saw and examined the patient and I agree with the above discussed plan of care      History of Present Illness:    Jesus Lockwood is a 77 y o  male who presents today for initial consultation for management of low back pain which is primarily right-sided  The patient reports pain which is moderate to severe in nature rated 10/10 on the pain scale  His pain is intermittent, with no typical pattern  The patient further describes pain as sharp and stabbing in nature on the right side  His pain is aggravated with bending and exercise  The patient has done a course of physical therapy without significant relief of pain  He had 2 sets of lumbar epidural steroid injections with minimal relief  He continues to perform home exercises, use a TENS unit, heat and ice treatment  Most recent MRI of the lumbosacral spine demonstrates multilevel spondylitic degenerative changes  He takes over-the-counter analgesics p r n       I have personally reviewed and/or updated the patient's past medical history, past surgical history, family history, social history, current medications, allergies, and vital signs today  Review of Systems:    Review of Systems   Constitutional: Negative for fever and unexpected weight change  HENT: Negative for trouble swallowing  Eyes: Negative for visual disturbance  Respiratory: Negative for shortness of breath and wheezing  Cardiovascular: Positive for chest pain and palpitations  Gastrointestinal: Positive for diarrhea  Negative for constipation, nausea and vomiting  Endocrine: Negative for cold intolerance, heat intolerance and polydipsia  Genitourinary: Negative for difficulty urinating and frequency  Musculoskeletal: Negative for arthralgias, gait problem, joint swelling and myalgias  Joint stiffness   Skin: Negative for rash  Neurological: Negative for dizziness, seizures, syncope, weakness and headaches  Hematological: Does not bruise/bleed easily  Psychiatric/Behavioral: Negative for dysphoric mood  All other systems reviewed and are negative        Patient Active Problem List   Diagnosis    Hypertension, essential    Chest discomfort    Mixed hyperlipidemia    Gastroesophageal reflux disease without esophagitis    Chronic right-sided low back pain       Past Medical History:   Diagnosis Date    Arthritis     CAD (coronary artery disease)     GERD (gastroesophageal reflux disease)     Glaucoma     Hyperlipidemia     Hypertension     IBS (irritable colon syndrome)     NSVT (nonsustained ventricular tachycardia) (HCC)     PVC (premature ventricular contraction)        Past Surgical History:   Procedure Laterality Date    COLONOSCOPY      NASAL SEPTUM SURGERY      [place] ESOPHAGOGASTRODUODENOSCOPY TRANSORAL DIAGNOSTIC N/A 8/7/2017    Procedure: EGD AND COLONOSCOPY;  Surgeon: Bettie Noriega MD;  Location: [facility]; Service: Gastroenterology    TONSILLECTOMY         Family History   Problem Relation Age of Onset    Heart disease Mother     Dementia Father     Cancer Father     Heart disease Father        Social History     Occupational History    Not on file  Social History Main Topics    Smoking status: Former Smoker     Quit date: 1989    Smokeless tobacco: Never Used    Alcohol use Yes      Comment: infrequent    Drug use: No    Sexual activity: Not on file       Current Outpatient Prescriptions on File Prior to Visit   Medication Sig    aspirin (ECOTRIN LOW STRENGTH) 81 mg EC tablet Take 81 mg by mouth daily    atorvastatin (LIPITOR) 20 mg tablet Take 20 mg by mouth daily    diclofenac (VOLTAREN) 75 mg EC tablet Take 75 mg by mouth 2 (two) times a day    pantoprazole (PROTONIX) 20 mg tablet Take 1 tablet (20 mg total) by mouth daily    timolol (BETIMOL) 0 25 % ophthalmic solution 1-2 drops 2 (two) times a day    verapamil (CALAN) 80 mg tablet Take 1 tablet by mouth 4 (four) times a day     No current facility-administered medications on file prior to visit          No Known Allergies    Physical Exam:    /64   Pulse 91   Resp 16   Ht 5' 5 75" (1 67 m)   Wt 85 3 kg (188 lb)   BMI 30 58 kg/m²     Constitutional: normal, well developed, well nourished, alert, in no distress and non-toxic and no overt pain behavior  Eyes: anicteric  HEENT: grossly intact  Neck: supple, symmetric, trachea midline and no masses   Pulmonary:even and unlabored  Cardiovascular:No edema or pitting edema present  Skin:Normal without rashes or lesions and well hydrated  Psychiatric:Mood and affect appropriate  Neurologic:Cranial Nerves II-XII grossly intact  Musculoskeletal:normal    Lumbar Spine Exam    Appearance:  Normal lordosis  Palpation/Tenderness:  right lumbar paraspinal tenderness  Sensory:  no sensory deficits noted  Range of Motion:  Extension:  Moderately limited  with pain  Motor Strength:  Left foot dorsiflexion:  5/5  Left foot plantar flexion:  5/5  Right foot dorsiflexion:  5/5  Right foot plantar flexion:  5/5  Reflexes:  Left Patellar:  2+   Right Patellar:  2+   Special Tests:  Positive facet loading on the right     Imaging  MRI LUMBAR SPINE WITHOUT CONTRAST     INDICATION: Chronic low back pain  Low back pain not going down extremities        COMPARISON:  None      TECHNIQUE:  Sagittal T1, sagittal T2, sagittal inversion recovery, axial T1 and axial T2, coronal T2        IMAGE QUALITY:  Diagnostic     FINDINGS:     ALIGNMENT:  Normal alignment of the lumbar spine  No compression fracture  No spondylolysis or spondylolisthesis  No scoliosis      MARROW SIGNAL:  Normal marrow signal is identified within the visualized bony structures  No discrete marrow lesion      DISTAL CORD AND CONUS:  Normal size and signal within the distal cord and conus  The conus ends at the T12-L1 level      PARASPINAL SOFT TISSUES:  Paraspinal soft tissues are unremarkable      SACRUM:  Normal signal within the sacrum   No evidence of insufficiency or stress fracture      LOWER THORACIC DISC SPACES:  Normal disc height and signal   No disc herniation, canal stenosis or foraminal narrowing      LUMBAR DISC SPACES:     L1-L2:  Small right paramedian protrusion type disc herniation with minimal annular bulge  No significant central or foraminal narrowing      L2-L3:  Diffuse annular bulge with small marginal osteophytes  There is mild central stenosis  Mild to moderate facet hypertrophy      L3-L4:  Mild annular bulge with small marginal osteophytes  There is mild central stenosis without significant foraminal narrowing      L4-L5:  Moderate facet hypertrophy  There is mild annular bulging with small marginal osteophytes  Mild to moderate left foraminal narrowing identified  Central canal patent      L5-S1:  Moderate facet hypertrophy  Degenerative disc osteophyte complex with marginal osteophytes results in mild to moderate bilateral foraminal narrowing  Central canal patent      IMPRESSION:     Spondylotic degenerative disease results in mild to moderate bilateral foraminal narrowing at L5-S1 and mild to moderate left foraminal narrowing at L4-5  No greater than mild central or foraminal narrowing at remaining levels

## 2018-08-28 ENCOUNTER — HOSPITAL ENCOUNTER (OUTPATIENT)
Dept: RADIOLOGY | Facility: CLINIC | Age: 68
Discharge: HOME | End: 2018-08-28
Attending: ANESTHESIOLOGY | Admitting: ANESTHESIOLOGY
Payer: MEDICARE

## 2018-08-28 VITALS
RESPIRATION RATE: 18 BRPM | TEMPERATURE: 98.5 F | SYSTOLIC BLOOD PRESSURE: 125 MMHG | HEART RATE: 54 BPM | OXYGEN SATURATION: 93 % | DIASTOLIC BLOOD PRESSURE: 76 MMHG

## 2018-08-28 DIAGNOSIS — M47.816 LUMBAR SPONDYLOSIS: ICD-10-CM

## 2018-08-28 PROCEDURE — 64494 INJ PARAVERT F JNT L/S 2 LEV: CPT | Performed by: ANESTHESIOLOGY

## 2018-08-28 PROCEDURE — 64493 INJ PARAVERT F JNT L/S 1 LEV: CPT | Performed by: ANESTHESIOLOGY

## 2018-08-28 PROCEDURE — 64495 INJ PARAVERT F JNT L/S 3 LEV: CPT | Performed by: ANESTHESIOLOGY

## 2018-08-28 RX ADMIN — Medication 5 ML: at 09:42

## 2018-08-28 NOTE — INTERVAL H&P NOTE
Update: (This section must be completed if the H&P was completed greater than 24 hrs to procedure or admission)    H&P reviewed  After examining the patient, I find no changed to the H&P since it had been written  Patient re-evaluated   Accept as history and physical     Emelyn Cuello MD/August 28, 2018/9:35 AM

## 2018-08-28 NOTE — H&P (VIEW-ONLY)
Assessment:  1  Lumbar spondylosis  FL spine and pain procedure       Plan: This is a 64-year-old male who presents today for initial consultation for management of low back pain  On physical examination, there is worsening pain with lumbar spine extension and lateral rotation  This is indicative of positive facet loading  The patient's pain persists despite time, relative rest, activity modification and therapy  Based on the patient's symptoms and examination, I suspect that a component of pain is being generated by the facet joints  The facet joints are only one of several possible axial pain generators  This was reviewed with the patient today  We will move forward with medial branch blockade at levels [right L2-L5] utilizing a double block paradigm  If the patient receives significant relief of appropriate duration with 2% lidocaine, we will confirm with   25% bupivacaine  If the patient demonstrates appropriate response to medial branch blockade we will schedule radiofrequency ablation of the lumbar medial branches to essentially denervate the facet joints  In the office today, we reviewed the nature of the patient's pathology in depth using diagrams and models  We discussed the approach we would take for the medial branch block and provided literature for home review  The patient understands the risks associated with the procedure including bleeding, infection, tissue action, allergic reaction, nerve injury and verbal and written consent was obtained today  Home exercises encouraged  Back brace ordered through [employer]    My impressions and treatment recommendations were discussed in detail with the patient who verbalized understanding and had no further questions  Discharge instructions were provided  I personally saw and examined the patient and I agree with the above discussed plan of care      History of Present Illness:    Jared Perez is a 77 y o  male who presents today for initial consultation for management of low back pain which is primarily right-sided  The patient reports pain which is moderate to severe in nature rated 10/10 on the pain scale  His pain is intermittent, with no typical pattern  The patient further describes pain as sharp and stabbing in nature on the right side  His pain is aggravated with bending and exercise  The patient has done a course of physical therapy without significant relief of pain  He had 2 sets of lumbar epidural steroid injections with minimal relief  He continues to perform home exercises, use a TENS unit, heat and ice treatment  Most recent MRI of the lumbosacral spine demonstrates multilevel spondylitic degenerative changes  He takes over-the-counter analgesics p r n       I have personally reviewed and/or updated the patient's past medical history, past surgical history, family history, social history, current medications, allergies, and vital signs today  Review of Systems:    Review of Systems   Constitutional: Negative for fever and unexpected weight change  HENT: Negative for trouble swallowing  Eyes: Negative for visual disturbance  Respiratory: Negative for shortness of breath and wheezing  Cardiovascular: Positive for chest pain and palpitations  Gastrointestinal: Positive for diarrhea  Negative for constipation, nausea and vomiting  Endocrine: Negative for cold intolerance, heat intolerance and polydipsia  Genitourinary: Negative for difficulty urinating and frequency  Musculoskeletal: Negative for arthralgias, gait problem, joint swelling and myalgias  Joint stiffness   Skin: Negative for rash  Neurological: Negative for dizziness, seizures, syncope, weakness and headaches  Hematological: Does not bruise/bleed easily  Psychiatric/Behavioral: Negative for dysphoric mood  All other systems reviewed and are negative        Patient Active Problem List   Diagnosis    Hypertension, essential    Chest discomfort    Mixed hyperlipidemia    Gastroesophageal reflux disease without esophagitis    Chronic right-sided low back pain       Past Medical History:   Diagnosis Date    Arthritis     CAD (coronary artery disease)     GERD (gastroesophageal reflux disease)     Glaucoma     Hyperlipidemia     Hypertension     IBS (irritable colon syndrome)     NSVT (nonsustained ventricular tachycardia) (HCC)     PVC (premature ventricular contraction)        Past Surgical History:   Procedure Laterality Date    COLONOSCOPY      NASAL SEPTUM SURGERY      [place] ESOPHAGOGASTRODUODENOSCOPY TRANSORAL DIAGNOSTIC N/A 8/7/2017    Procedure: EGD AND COLONOSCOPY;  Surgeon: Manjula Au MD;  Location: [facility]; Service: Gastroenterology    TONSILLECTOMY         Family History   Problem Relation Age of Onset    Heart disease Mother     Dementia Father     Cancer Father     Heart disease Father        Social History     Occupational History    Not on file  Social History Main Topics    Smoking status: Former Smoker     Quit date: 1989    Smokeless tobacco: Never Used    Alcohol use Yes      Comment: infrequent    Drug use: No    Sexual activity: Not on file       Current Outpatient Prescriptions on File Prior to Visit   Medication Sig    aspirin (ECOTRIN LOW STRENGTH) 81 mg EC tablet Take 81 mg by mouth daily    atorvastatin (LIPITOR) 20 mg tablet Take 20 mg by mouth daily    diclofenac (VOLTAREN) 75 mg EC tablet Take 75 mg by mouth 2 (two) times a day    pantoprazole (PROTONIX) 20 mg tablet Take 1 tablet (20 mg total) by mouth daily    timolol (BETIMOL) 0 25 % ophthalmic solution 1-2 drops 2 (two) times a day    verapamil (CALAN) 80 mg tablet Take 1 tablet by mouth 4 (four) times a day     No current facility-administered medications on file prior to visit          No Known Allergies    Physical Exam:    /64   Pulse 91   Resp 16   Ht 5' 5 75" (1 67 m)   Wt 85 3 kg (188 lb)   BMI 30 58 kg/m²     Constitutional: normal, well developed, well nourished, alert, in no distress and non-toxic and no overt pain behavior  Eyes: anicteric  HEENT: grossly intact  Neck: supple, symmetric, trachea midline and no masses   Pulmonary:even and unlabored  Cardiovascular:No edema or pitting edema present  Skin:Normal without rashes or lesions and well hydrated  Psychiatric:Mood and affect appropriate  Neurologic:Cranial Nerves II-XII grossly intact  Musculoskeletal:normal    Lumbar Spine Exam    Appearance:  Normal lordosis  Palpation/Tenderness:  right lumbar paraspinal tenderness  Sensory:  no sensory deficits noted  Range of Motion:  Extension:  Moderately limited  with pain  Motor Strength:  Left foot dorsiflexion:  5/5  Left foot plantar flexion:  5/5  Right foot dorsiflexion:  5/5  Right foot plantar flexion:  5/5  Reflexes:  Left Patellar:  2+   Right Patellar:  2+   Special Tests:  Positive facet loading on the right     Imaging  MRI LUMBAR SPINE WITHOUT CONTRAST     INDICATION: Chronic low back pain  Low back pain not going down extremities        COMPARISON:  None      TECHNIQUE:  Sagittal T1, sagittal T2, sagittal inversion recovery, axial T1 and axial T2, coronal T2        IMAGE QUALITY:  Diagnostic     FINDINGS:     ALIGNMENT:  Normal alignment of the lumbar spine  No compression fracture  No spondylolysis or spondylolisthesis  No scoliosis      MARROW SIGNAL:  Normal marrow signal is identified within the visualized bony structures  No discrete marrow lesion      DISTAL CORD AND CONUS:  Normal size and signal within the distal cord and conus  The conus ends at the T12-L1 level      PARASPINAL SOFT TISSUES:  Paraspinal soft tissues are unremarkable      SACRUM:  Normal signal within the sacrum   No evidence of insufficiency or stress fracture      LOWER THORACIC DISC SPACES:  Normal disc height and signal   No disc herniation, canal stenosis or foraminal narrowing      LUMBAR DISC SPACES:     L1-L2:  Small right paramedian protrusion type disc herniation with minimal annular bulge  No significant central or foraminal narrowing      L2-L3:  Diffuse annular bulge with small marginal osteophytes  There is mild central stenosis  Mild to moderate facet hypertrophy      L3-L4:  Mild annular bulge with small marginal osteophytes  There is mild central stenosis without significant foraminal narrowing      L4-L5:  Moderate facet hypertrophy  There is mild annular bulging with small marginal osteophytes  Mild to moderate left foraminal narrowing identified  Central canal patent      L5-S1:  Moderate facet hypertrophy  Degenerative disc osteophyte complex with marginal osteophytes results in mild to moderate bilateral foraminal narrowing  Central canal patent      IMPRESSION:     Spondylotic degenerative disease results in mild to moderate bilateral foraminal narrowing at L5-S1 and mild to moderate left foraminal narrowing at L4-5  No greater than mild central or foraminal narrowing at remaining levels

## 2018-08-28 NOTE — INTERVAL H&P NOTE
Update: (This section must be completed if the H&P was completed greater than 24 hrs to procedure or admission)    H&P reviewed  After examining the patient, I find no changed to the H&P since it had been written  Patient re-evaluated   Accept as history and physical     Liane Arreola MD/August 28, 2018/9:47 AM

## 2018-08-28 NOTE — DISCHARGE INSTR - LAB

## 2018-08-29 ENCOUNTER — TELEPHONE (OUTPATIENT)
Dept: PAIN MEDICINE | Facility: CLINIC | Age: 68
End: 2018-08-29

## 2018-08-29 NOTE — TELEPHONE ENCOUNTER
S/p R L2-L5 MBB #1 8/28  Pain diary received and shows 50-80% relief for the first hour, then % for the next 12 hours  Schedule MBB #2?

## 2018-09-18 ENCOUNTER — HOSPITAL ENCOUNTER (OUTPATIENT)
Dept: RADIOLOGY | Facility: CLINIC | Age: 68
Discharge: HOME | End: 2018-09-18
Attending: ANESTHESIOLOGY | Admitting: ANESTHESIOLOGY
Payer: MEDICARE

## 2018-09-18 VITALS
RESPIRATION RATE: 20 BRPM | HEART RATE: 55 BPM | TEMPERATURE: 97.8 F | DIASTOLIC BLOOD PRESSURE: 75 MMHG | OXYGEN SATURATION: 95 % | SYSTOLIC BLOOD PRESSURE: 129 MMHG

## 2018-09-18 DIAGNOSIS — M47.816 LUMBAR SPONDYLOSIS: ICD-10-CM

## 2018-09-18 PROCEDURE — 64495 INJ PARAVERT F JNT L/S 3 LEV: CPT | Performed by: ANESTHESIOLOGY

## 2018-09-18 PROCEDURE — 64494 INJ PARAVERT F JNT L/S 2 LEV: CPT | Performed by: ANESTHESIOLOGY

## 2018-09-18 PROCEDURE — 64493 INJ PARAVERT F JNT L/S 1 LEV: CPT | Performed by: ANESTHESIOLOGY

## 2018-09-18 RX ORDER — BUPIVACAINE HCL/PF 2.5 MG/ML
10 VIAL (ML) INJECTION ONCE
Status: DISCONTINUED | OUTPATIENT
Start: 2018-09-18 | End: 2018-09-22 | Stop reason: HOSPADM

## 2018-09-18 NOTE — DISCHARGE INSTR - LAB

## 2018-09-18 NOTE — H&P
History of Present Illness: The patient is a 77 y o  male who presents with complaints of Low back pain  Patient is here today for a RIGHT L2-L5 Medial Branch Block  Patient Active Problem List   Diagnosis    Hypertension, essential    Chest discomfort    Mixed hyperlipidemia    Gastroesophageal reflux disease without esophagitis    Chronic right-sided low back pain    Lumbar spondylosis       Past Medical History:   Diagnosis Date    Arthritis     CAD (coronary artery disease)     Chronic right-sided low back pain     GERD (gastroesophageal reflux disease)     Glaucoma     Hyperlipidemia     Hypertension     IBS (irritable colon syndrome)     NSVT (nonsustained ventricular tachycardia) (HCC)     PVC (premature ventricular contraction)        Past Surgical History:   Procedure Laterality Date    COLONOSCOPY      NASAL SEPTUM SURGERY      [place] ESOPHAGOGASTRODUODENOSCOPY TRANSORAL DIAGNOSTIC N/A 8/7/2017    Procedure: EGD AND COLONOSCOPY;  Surgeon: Roxy Gore MD;  Location: [facility];   Service: Gastroenterology    TONSILLECTOMY           Current Outpatient Prescriptions:     diclofenac (VOLTAREN) 75 mg EC tablet, Take 75 mg by mouth 2 (two) times a day, Disp: , Rfl:     aspirin (ECOTRIN LOW STRENGTH) 81 mg EC tablet, Take 81 mg by mouth daily, Disp: , Rfl:     atorvastatin (LIPITOR) 20 mg tablet, Take 20 mg by mouth daily, Disp: , Rfl:     pantoprazole (PROTONIX) 20 mg tablet, Take 1 tablet (20 mg total) by mouth daily, Disp: 30 tablet, Rfl: 4    timolol (BETIMOL) 0 25 % ophthalmic solution, 1-2 drops 2 (two) times a day, Disp: , Rfl:     verapamil (CALAN) 80 mg tablet, Take 1 tablet by mouth 4 (four) times a day, Disp: , Rfl:     Current Facility-Administered Medications:     bupivacaine (PF) (MARCAINE) 0 25 % injection 10 mL, 10 mL, Perineural, Once, Kapil Price MD    No Known Allergies    Physical Exam:   Vitals:    09/18/18 0846   BP: 129/75   Pulse: 55   Resp: 20   Temp: SpO2: 95%     General: Awake, Alert, Oriented x 3, Mood and affect appropriate  Respiratory: Respirations even and unlabored  Cardiovascular: Peripheral pulses intact; no edema  Musculoskeletal Exam: Worsening low back pain with lumbar spine extension and lateral rotation  ASA Score: 2    Patient/Chart Verification  Patient ID Verified: Verbal  ID Band Applied: No  Consents Confirmed: To be obtained in the Pre-Procedure area  H&P( within 30 days) Verified: To be obtained in the Pre-Procedure area  Interval H&P(within 24 hr) Complete (required for Outpatients and Surgery Admit only): To be obtained in the Pre-Procedure area  Allergies Reviewed: Yes  Anticoag/NSAID held?: NA  Currently on antibiotics?: Yes (on abx for sinuses)  Pregnancy denied?: NA    Assessment:   1   Lumbar spondylosis        Plan: RIGHT L2-L5 MBB #2

## 2018-09-19 ENCOUNTER — TELEPHONE (OUTPATIENT)
Dept: PAIN MEDICINE | Facility: CLINIC | Age: 68
End: 2018-09-19

## 2018-09-19 NOTE — TELEPHONE ENCOUNTER
S/p R L2-L5 MBB #2 9/18  Pain diary shows % pain relief immediately after procedure and lasting 12 hours

## 2018-10-23 ENCOUNTER — TELEPHONE (OUTPATIENT)
Dept: RADIOLOGY | Facility: CLINIC | Age: 68
End: 2018-10-23

## 2018-10-23 ENCOUNTER — HOSPITAL ENCOUNTER (OUTPATIENT)
Dept: RADIOLOGY | Facility: CLINIC | Age: 68
Discharge: HOME | End: 2018-10-23
Attending: ANESTHESIOLOGY
Payer: MEDICARE

## 2018-10-23 VITALS
RESPIRATION RATE: 18 BRPM | DIASTOLIC BLOOD PRESSURE: 87 MMHG | OXYGEN SATURATION: 95 % | TEMPERATURE: 98.2 F | HEART RATE: 61 BPM | SYSTOLIC BLOOD PRESSURE: 129 MMHG

## 2018-10-23 DIAGNOSIS — M47.816 LUMBAR SPONDYLOSIS: ICD-10-CM

## 2018-10-23 PROCEDURE — 64635 DESTROY LUMB/SAC FACET JNT: CPT | Performed by: ANESTHESIOLOGY

## 2018-10-23 PROCEDURE — 64636 DESTROY L/S FACET JNT ADDL: CPT | Performed by: ANESTHESIOLOGY

## 2018-10-23 RX ORDER — LIDOCAINE HYDROCHLORIDE 10 MG/ML
5 INJECTION, SOLUTION EPIDURAL; INFILTRATION; INTRACAUDAL; PERINEURAL ONCE
Status: COMPLETED | OUTPATIENT
Start: 2018-10-23 | End: 2018-10-23

## 2018-10-23 RX ORDER — BUPIVACAINE HYDROCHLORIDE 5 MG/ML
30 INJECTION, SOLUTION EPIDURAL; INTRACAUDAL ONCE
Status: COMPLETED | OUTPATIENT
Start: 2018-10-23 | End: 2018-10-23

## 2018-10-23 RX ORDER — METHYLPREDNISOLONE ACETATE 80 MG/ML
80 INJECTION, SUSPENSION INTRA-ARTICULAR; INTRALESIONAL; INTRAMUSCULAR; PARENTERAL; SOFT TISSUE ONCE
Status: COMPLETED | OUTPATIENT
Start: 2018-10-23 | End: 2018-10-23

## 2018-10-23 RX ADMIN — BUPIVACAINE HYDROCHLORIDE 30 ML: 5 INJECTION, SOLUTION EPIDURAL; INTRACAUDAL at 11:07

## 2018-10-23 RX ADMIN — METHYLPREDNISOLONE ACETATE 80 MG: 80 INJECTION, SUSPENSION INTRA-ARTICULAR; INTRALESIONAL; INTRAMUSCULAR; PARENTERAL; SOFT TISSUE at 11:08

## 2018-10-23 RX ADMIN — LIDOCAINE HYDROCHLORIDE 5 ML: 10 INJECTION, SOLUTION EPIDURAL; INFILTRATION; INTRACAUDAL; PERINEURAL at 11:00

## 2018-10-23 NOTE — H&P
History of Present Illness: The patient is a 77 y o  male who presents with complaints of Low back pain  Patient is here for a right L2-L5 RFA  Patient Active Problem List   Diagnosis    Hypertension, essential    Chest discomfort    Mixed hyperlipidemia    Gastroesophageal reflux disease without esophagitis    Chronic right-sided low back pain    Lumbar spondylosis       Past Medical History:   Diagnosis Date    Arthritis     CAD (coronary artery disease)     Chronic right-sided low back pain     GERD (gastroesophageal reflux disease)     Glaucoma     Hyperlipidemia     Hypertension     IBS (irritable colon syndrome)     NSVT (nonsustained ventricular tachycardia) (HCC)     PVC (premature ventricular contraction)        Past Surgical History:   Procedure Laterality Date    COLONOSCOPY      NASAL SEPTUM SURGERY      [place] ESOPHAGOGASTRODUODENOSCOPY TRANSORAL DIAGNOSTIC N/A 8/7/2017    Procedure: EGD AND COLONOSCOPY;  Surgeon: David Acuña MD;  Location: [facility]; Service: Gastroenterology    TONSILLECTOMY           Current Outpatient Prescriptions:     aspirin (ECOTRIN LOW STRENGTH) 81 mg EC tablet, Take 81 mg by mouth daily, Disp: , Rfl:     atorvastatin (LIPITOR) 20 mg tablet, Take 20 mg by mouth daily, Disp: , Rfl:     diclofenac (VOLTAREN) 75 mg EC tablet, Take 75 mg by mouth 2 (two) times a day, Disp: , Rfl:     pantoprazole (PROTONIX) 20 mg tablet, Take 1 tablet (20 mg total) by mouth daily, Disp: 30 tablet, Rfl: 4    timolol (BETIMOL) 0 25 % ophthalmic solution, 1-2 drops 2 (two) times a day, Disp: , Rfl:     verapamil (CALAN) 80 mg tablet, Take 1 tablet by mouth 4 (four) times a day, Disp: , Rfl:     No Known Allergies    Physical Exam: There were no vitals filed for this visit    General: Awake, Alert, Oriented x 3, Mood and affect appropriate  Respiratory: Respirations even and unlabored  Cardiovascular: Peripheral pulses intact; no edema  Musculoskeletal Exam: Worsening low back pain with lumbar spine extension    ASA Score: 2    Patient/Chart Verification  Patient ID Verified: Verbal  ID Band Applied: No  Consents Confirmed: Procedural, To be obtained in the Pre-Procedure area  H&P( within 30 days) Verified: To be obtained in the Pre-Procedure area  Interval H&P(within 24 hr) Complete (required for Outpatients and Surgery Admit only): To be obtained in the Pre-Procedure area  Allergies Reviewed: Yes  Anticoag/NSAID held?: NA  Currently on antibiotics?: No    Assessment:   1   Lumbar spondylosis        Plan: RIGHT L2-L5 RFA

## 2018-10-23 NOTE — DISCHARGE INSTR - LAB

## 2018-10-24 NOTE — TELEPHONE ENCOUNTER
Pt is returning call, pt states he is feeling pretty good    Pain level 0/10, pt stated he thinks it might have worked    Pt scheduled for a 6wk f/u 12/5

## 2019-01-31 ENCOUNTER — OFFICE VISIT (OUTPATIENT)
Dept: GASTROENTEROLOGY | Facility: CLINIC | Age: 69
End: 2019-01-31
Payer: MEDICARE

## 2019-01-31 VITALS
WEIGHT: 187 LBS | DIASTOLIC BLOOD PRESSURE: 80 MMHG | SYSTOLIC BLOOD PRESSURE: 132 MMHG | HEART RATE: 70 BPM | BODY MASS INDEX: 31.16 KG/M2 | HEIGHT: 65 IN

## 2019-01-31 DIAGNOSIS — K21.9 GASTROESOPHAGEAL REFLUX DISEASE WITHOUT ESOPHAGITIS: Primary | ICD-10-CM

## 2019-01-31 DIAGNOSIS — K58.0 IRRITABLE BOWEL SYNDROME WITH DIARRHEA: ICD-10-CM

## 2019-01-31 PROCEDURE — 99213 OFFICE O/P EST LOW 20 MIN: CPT | Performed by: NURSE PRACTITIONER

## 2019-01-31 RX ORDER — FAMOTIDINE 40 MG/1
40 TABLET, FILM COATED ORAL
Qty: 90 TABLET | Refills: 3 | Status: SHIPPED | OUTPATIENT
Start: 2019-01-31 | End: 2019-08-20 | Stop reason: SDDI

## 2019-01-31 NOTE — PROGRESS NOTES
Assessment/Plan:    GERD with esophagitis and some breakthrough symptoms of GERD  EGD 8/2017- biopsy benign  Plan- renew pantoprazole and add Pepcid    IBS-D Colonoscopy with benign polypectomy 8/2017  Bowel movements 3 times day currently taking Imodium for years  Will try Viberzi  Samples of 100 mg given to take once daily to begin with  Discussed medication and possible sfx  He will call if he has any  No PMH of gallbladder issues or pancreatitis    Anxiety  Referred back to PCP to discuss medication options       Diagnoses and all orders for this visit:    Gastroesophageal reflux disease without esophagitis  -     famotidine (PEPCID) 40 MG tablet; Take 1 tablet (40 mg total) by mouth daily at bedtime    Irritable bowel syndrome with diarrhea  -     Eluxadoline (VIBERZI) 100 MG TABS; Take 1 tablet (100 mg total) by mouth daily            Subjective:      Patient ID: Pranav Fletcher is a 77 y o  male  The patient has history of GERD with esophagitis and has had more recently some breakthrough symptoms  He has had some changes in his diet is not really following a GERD diet  So reviewed her diet and I ordered Pepcid at night  He wanted to take pantoprazole twice a day but I told him that start with the Pepcid 1st   He is not having nausea or vomiting  No dysphagia  No sore throat or hoarse voice  Colonoscopy showed polyps but otherwise normal and the patient has irritable bowel syndrome with diarrhea  He takes Imodium daily and still feels that he may not be able to go anywhere due to his loose stools  He has 1-2 a day  Sometimes up to three or four without melena or hematochezia  Has a good appetite with no weight loss  We will try Viberzi He would still has his gallbladder and I did give him signs and symptoms to call me and when to stop the fiber C   He will call in a week and now GI still in and if he is doing well we will continue the Viberzi        The following portions of the patient's history were reviewed and updated as appropriate: He  has a past medical history of Arthritis; CAD (coronary artery disease); Chronic right-sided low back pain; GERD (gastroesophageal reflux disease); Glaucoma; Hyperlipidemia; Hypertension; IBS (irritable colon syndrome); [facility]onsust[facility]cardia) ([facility] ); and PVC (premature ventricular contraction)  He   Patient Active Problem List    Diagnosis Date Noted    Irritable bowel syndrome with diarrhea 01/31/2019    Lumbar spondylosis 08/08/2018    Chronic right-sided low back pain 07/18/2018    Hypertension, essential 02/14/2018    Chest discomfort 02/14/2018    Mixed hyperlipidemia 02/14/2018    Gastroesophageal reflux disease without esophagitis 02/14/2018     He  has a past surgical history that includes Nasal septum surgery; Tonsillectomy; Colonoscopy; pr esophagogastroduodenoscopy transoral diagnostic (N/A, 8/7/2017); and Cardiac catheterization (03/14/2018)  His family history includes Cancer in his father; Dementia in his father; Heart disease in his father and mother  He  reports that he quit smoking about 30 years ago  He has never used smokeless tobacco  He reports that he drinks alcohol  He reports that he does not use drugs    Current Outpatient Prescriptions   Medication Sig Dispense Refill    atorvastatin (LIPITOR) 20 mg tablet Take 20 mg by mouth daily      diclofenac (VOLTAREN) 75 mg EC tablet Take 75 mg by mouth 2 (two) times a day      pantoprazole (PROTONIX) 20 mg tablet Take 1 tablet (20 mg total) by mouth daily 30 tablet 4    timolol (BETIMOL) 0 25 % ophthalmic solution 1-2 drops 2 (two) times a day      verapamil (CALAN) 80 mg tablet Take 1 tablet by mouth 4 (four) times a day      aspirin (ECOTRIN LOW STRENGTH) 81 mg EC tablet Take 81 mg by mouth daily      Eluxadoline (VIBERZI) 100 MG TABS Take 1 tablet (100 mg total) by mouth daily 30 tablet 0    famotidine (PEPCID) 40 MG tablet Take 1 tablet (40 mg total) by mouth daily at bedtime 90 tablet 3     No current facility-administered medications for this visit  Current Outpatient Prescriptions on File Prior to Visit   Medication Sig    atorvastatin (LIPITOR) 20 mg tablet Take 20 mg by mouth daily    diclofenac (VOLTAREN) 75 mg EC tablet Take 75 mg by mouth 2 (two) times a day    pantoprazole (PROTONIX) 20 mg tablet Take 1 tablet (20 mg total) by mouth daily    timolol (BETIMOL) 0 25 % ophthalmic solution 1-2 drops 2 (two) times a day    verapamil (CALAN) 80 mg tablet Take 1 tablet by mouth 4 (four) times a day    aspirin (ECOTRIN LOW STRENGTH) 81 mg EC tablet Take 81 mg by mouth daily     No current facility-administered medications on file prior to visit  He has No Known Allergies       Review of Systems   Constitutional: Negative for activity change, appetite change, fatigue and unexpected weight change  Respiratory: Negative  Cardiovascular: Negative  Gastrointestinal: Positive for diarrhea  Negative for abdominal distention and abdominal pain  Musculoskeletal: Positive for arthralgias  Hematological: Negative  Psychiatric/Behavioral: Negative  Objective:      /80   Pulse 70   Ht 5' 5" (1 651 m)   Wt 84 8 kg (187 lb)   BMI 31 12 kg/m²          Physical Exam   Constitutional: He is oriented to person, place, and time  He appears well-developed and well-nourished  Eyes: No scleral icterus  Cardiovascular: Normal rate and regular rhythm  Pulmonary/Chest: Effort normal and breath sounds normal    Abdominal: Soft  Bowel sounds are normal    Lymphadenopathy:     He has no cervical adenopathy  Neurological: He is alert and oriented to person, place, and time  Skin: Skin is warm and dry  Psychiatric: He has a normal mood and affect

## 2019-02-04 ENCOUNTER — TELEPHONE (OUTPATIENT)
Dept: GASTROENTEROLOGY | Facility: CLINIC | Age: 69
End: 2019-02-04

## 2019-02-04 NOTE — TELEPHONE ENCOUNTER
Ptn took the viberzi for 3 days and felt like he had to make a BM but couldn't  That was Thursday through Saturday Sunday he stopped taking the viberzi and when to the bathroom 8 or 9 times and started back on his imodium   ptn would like to speak to [facility]

## 2019-02-06 DIAGNOSIS — R19.7 DIARRHEA, UNSPECIFIED TYPE: Primary | ICD-10-CM

## 2019-02-06 RX ORDER — CHOLESTYRAMINE LIGHT 4 G/5.7G
4 POWDER, FOR SUSPENSION ORAL DAILY
Qty: 210 G | Refills: 3 | Status: SHIPPED | OUTPATIENT
Start: 2019-02-06 | End: 2020-11-10

## 2019-02-06 NOTE — TELEPHONE ENCOUNTER
Spoke with pt- did not tolerate Viberzi- will try prevalite- sent  Py agreeable  For anxiety- referred back to pcp to discuss  Would like xanax   I did tell him there are many opportunities available that are not so addicting

## 2020-11-10 ENCOUNTER — OFFICE VISIT (OUTPATIENT)
Dept: PAIN MEDICINE | Facility: CLINIC | Age: 70
End: 2020-11-10
Payer: MEDICARE

## 2020-11-10 VITALS
HEART RATE: 58 BPM | BODY MASS INDEX: 32.22 KG/M2 | TEMPERATURE: 98.3 F | RESPIRATION RATE: 20 BRPM | HEIGHT: 65 IN | DIASTOLIC BLOOD PRESSURE: 79 MMHG | WEIGHT: 193.4 LBS | SYSTOLIC BLOOD PRESSURE: 134 MMHG

## 2020-11-10 DIAGNOSIS — G89.29 CHRONIC BILATERAL LOW BACK PAIN WITHOUT SCIATICA: Primary | ICD-10-CM

## 2020-11-10 DIAGNOSIS — M51.26 LUMBAR DISCOGENIC PAIN SYNDROME: ICD-10-CM

## 2020-11-10 DIAGNOSIS — M51.26 LUMBAR DISC HERNIATION: ICD-10-CM

## 2020-11-10 DIAGNOSIS — M54.50 CHRONIC BILATERAL LOW BACK PAIN WITHOUT SCIATICA: Primary | ICD-10-CM

## 2020-11-10 PROCEDURE — 99214 OFFICE O/P EST MOD 30 MIN: CPT | Performed by: STUDENT IN AN ORGANIZED HEALTH CARE EDUCATION/TRAINING PROGRAM

## 2020-12-03 ENCOUNTER — TELEPHONE (OUTPATIENT)
Dept: PAIN MEDICINE | Facility: CLINIC | Age: 70
End: 2020-12-03

## 2021-03-05 DIAGNOSIS — Z23 ENCOUNTER FOR IMMUNIZATION: ICD-10-CM

## 2021-03-19 ENCOUNTER — IMMUNIZATIONS (OUTPATIENT)
Dept: FAMILY MEDICINE CLINIC | Facility: HOSPITAL | Age: 71
End: 2021-03-19

## 2021-03-19 DIAGNOSIS — Z23 ENCOUNTER FOR IMMUNIZATION: Primary | ICD-10-CM

## 2021-03-19 PROCEDURE — 91300 SARS-COV-2 / COVID-19 MRNA VACCINE (PFIZER-BIONTECH) 30 MCG: CPT

## 2021-03-19 PROCEDURE — 0001A SARS-COV-2 / COVID-19 MRNA VACCINE (PFIZER-BIONTECH) 30 MCG: CPT

## 2021-04-09 ENCOUNTER — IMMUNIZATIONS (OUTPATIENT)
Dept: FAMILY MEDICINE CLINIC | Facility: HOSPITAL | Age: 71
End: 2021-04-09

## 2021-04-09 DIAGNOSIS — Z23 ENCOUNTER FOR IMMUNIZATION: Primary | ICD-10-CM

## 2021-04-09 PROCEDURE — 0002A SARS-COV-2 / COVID-19 MRNA VACCINE (PFIZER-BIONTECH) 30 MCG: CPT

## 2021-04-09 PROCEDURE — 91300 SARS-COV-2 / COVID-19 MRNA VACCINE (PFIZER-BIONTECH) 30 MCG: CPT

## 2021-05-26 ENCOUNTER — HOSPITAL ENCOUNTER (EMERGENCY)
Facility: HOSPITAL | Age: 71
Discharge: HOME | End: 2021-05-26
Attending: EMERGENCY MEDICINE
Payer: MEDICARE

## 2021-05-26 VITALS
WEIGHT: 186 LBS | HEIGHT: 65 IN | OXYGEN SATURATION: 98 % | SYSTOLIC BLOOD PRESSURE: 136 MMHG | HEART RATE: 64 BPM | DIASTOLIC BLOOD PRESSURE: 63 MMHG | RESPIRATION RATE: 18 BRPM | TEMPERATURE: 97.7 F | BODY MASS INDEX: 30.99 KG/M2

## 2021-05-26 DIAGNOSIS — R04.0 RIGHT-SIDED EPISTAXIS: Primary | ICD-10-CM

## 2021-05-26 PROCEDURE — 99283 EMERGENCY DEPT VISIT LOW MDM: CPT | Performed by: PHYSICIAN ASSISTANT

## 2021-05-26 PROCEDURE — 99283 EMERGENCY DEPT VISIT LOW MDM: CPT

## 2021-05-26 NOTE — ED NOTES
Patient discharged by provider  Patient ambulated out of department, steady gait, vss        Marty Maciel RN  05/26/21 5129

## 2021-05-26 NOTE — ED PROVIDER NOTES
History  Chief Complaint   Patient presents with    Nose Bleed     pt reports nose bleed starting this morning, + thinners     Patient is a 68-year-old male presents emergency department with complaints of nose bleed  Patient states for last 4 days he has been having recurrent nose bleeds  He states that he had 1 about 2 hours ago and packed his nose with tissue  He presents emergency department  He denies any dizziness, weakness  Prior to Admission Medications   Prescriptions Last Dose Informant Patient Reported? Taking?   atorvastatin (LIPITOR) 20 mg tablet  Self Yes No   Sig: Take 20 mg by mouth daily   diclofenac (VOLTAREN) 75 mg EC tablet  Self Yes No   Sig: Take 75 mg by mouth 2 (two) times a day   omeprazole (PriLOSEC) 40 MG capsule  Self No No   Sig: TAKE 1 CAPSULE BY MOUTH EVERY DAY   timolol (BETIMOL) 0 25 % ophthalmic solution  Self Yes No   Si-2 drops 2 (two) times a day   verapamil (CALAN) 80 mg tablet  Self Yes No   Sig: Take 1 tablet by mouth 4 (four) times a day      Facility-Administered Medications: None       Past Medical History:   Diagnosis Date    Arthritis     CAD (coronary artery disease)     Chronic right-sided low back pain     GERD (gastroesophageal reflux disease)     Glaucoma     Hyperlipidemia     Hypertension     IBS (irritable colon syndrome)     NSVT (nonsustained ventricular tachycardia) (HCC)     PVC (premature ventricular contraction)        Past Surgical History:   Procedure Laterality Date    [facility]    COLONOSCOPY      NASAL SEPTUM SURGERY      [place] ESOPHAGOGASTRODUODENOSCOPY TRANSORAL DIAGNOSTIC N/A 2017    Procedure: EGD AND COLONOSCOPY;  Surgeon: Audie Talley MD;  Location: [facility];   Service: Gastroenterology    TONSILLECTOMY         Family History   Problem Relation Age of Onset    Heart disease Mother     Dementia Father     Cancer Father     Heart disease Father      I have reviewed and agree with the history as documented  E-Cigarette/Vaping    E-Cigarette Use Never User      E-Cigarette/Vaping Substances    Nicotine No     THC No     CBD No     Flavoring No     Other No     Unknown No      Social History     Tobacco Use    Smoking status: Former Smoker     Quit date:      Years since quittin 4    Smokeless tobacco: Never Used   Substance Use Topics    Alcohol use: Yes     Comment: infrequent    Drug use: No       Review of Systems   Constitutional: Negative for fever  HENT: Positive for nosebleeds  Respiratory: Negative for shortness of breath  Cardiovascular: Negative for chest pain  Gastrointestinal: Negative for abdominal pain  Neurological: Negative for dizziness and light-headedness  All other systems reviewed and are negative  Physical Exam  Physical Exam  Vitals signs and nursing note reviewed  Constitutional:       Appearance: Normal appearance  HENT:      Head: Normocephalic and atraumatic  Right Ear: Tympanic membrane, ear canal and external ear normal       Left Ear: Tympanic membrane, ear canal and external ear normal       Nose: Nose normal       Right Nostril: No epistaxis or septal hematoma  Left Nostril: No epistaxis or septal hematoma  Mouth/Throat:      Mouth: Mucous membranes are moist    Eyes:      Extraocular Movements: Extraocular movements intact  Conjunctiva/sclera: Conjunctivae normal       Pupils: Pupils are equal, round, and reactive to light  Cardiovascular:      Rate and Rhythm: Normal rate and regular rhythm  Pulses: Normal pulses  Heart sounds: Normal heart sounds  Pulmonary:      Effort: Pulmonary effort is normal       Breath sounds: Normal breath sounds  Abdominal:      General: Bowel sounds are normal       Palpations: Abdomen is soft  Skin:     General: Skin is warm  Capillary Refill: Capillary refill takes less than 2 seconds     Neurological:      General: No focal deficit present  Mental Status: He is alert and oriented to person, place, and time  Psychiatric:         Mood and Affect: Mood normal          Behavior: Behavior normal          Thought Content: Thought content normal          Judgment: Judgment normal          Vital Signs  ED Triage Vitals [05/26/21 0846]   Temperature Pulse Respirations Blood Pressure SpO2   97 7 °F (36 5 °C) 64 18 136/63 98 %      Temp Source Heart Rate Source Patient Position - Orthostatic VS BP Location FiO2 (%)   Temporal Monitor Sitting Left arm --      Pain Score       No Pain           Vitals:    05/26/21 0846   BP: 136/63   Pulse: 64   Patient Position - Orthostatic VS: Sitting         Visual Acuity      ED Medications  Medications - No data to display    Diagnostic Studies  Results Reviewed     None                 No orders to display              Procedures  Procedures         ED Course                             SBIRT 20yo+      Most Recent Value   SBIRT (22 yo +)   In order to provide better care to our patients, we are screening all of our patients for alcohol and drug use  Would it be okay to ask you these screening questions? Yes Filed at: 05/26/2021 6585   Initial Alcohol Screen: US AUDIT-C    1  How often do you have a drink containing alcohol?  0 Filed at: 05/26/2021 0949   2  How many drinks containing alcohol do you have on a typical day you are drinking? 0 Filed at: 05/26/2021 0949   3b  FEMALE Any Age, or MALE 65+: How often do you have 4 or more drinks on one occassion? 0 Filed at: 05/26/2021 0949   Audit-C Score  0 Filed at: 05/26/2021 2741   MORENO: How many times in the past year have you    Used an illegal drug or used a prescription medication for non-medical reasons? Never Filed at: 05/26/2021 7165                    MDM  Number of Diagnoses or Management Options  Right-sided epistaxis:   Diagnosis management comments: Patient is a 49-year-old male presents emergency department with complaints of nose bleed  Patient states for last 4 days he has been having recurrent nose bleeds  He states that he had 1 about 2 hours ago and packed his nose with tissue  He presents emergency department  He denies any dizziness, weakness  On examination, exam is unremarkable  There is no active bleeding noted at the time of exam   There is no evidence of septal hematoma  Patient was observed emergency department and no recurrent nose bleed occurred  I recommend follow-up with ENT  Return parameters were discussed  Patient stable for discharge  Risk of Complications, Morbidity, and/or Mortality  Presenting problems: minimal  Diagnostic procedures: minimal  Management options: minimal    Patient Progress  Patient progress: stable      Disposition  Final diagnoses:   Right-sided epistaxis     Time reflects when diagnosis was documented in both MDM as applicable and the Disposition within this note     Time User Action Codes Description Comment    5/26/2021 10:18 AM Marshall Bond Add [R04 0] Right-sided epistaxis       ED Disposition     ED Disposition Condition Date/Time Comment    Discharge Good Wed May 26, 2021 [address] discharge to home/self care              Follow-up Information     Follow up With Specialties Details Why Maureen Sifuentes MD Otolaryngology   [address] [place] [address] [address]  [phone]            Discharge Medication List as of 5/26/2021 10:18 AM      CONTINUE these medications which have NOT CHANGED    Details   atorvastatin (LIPITOR) 20 mg tablet Take 20 mg by mouth daily, Historical Med      diclofenac (VOLTAREN) 75 mg EC tablet Take 75 mg by mouth 2 (two) times a day, Historical Med      omeprazole (PriLOSEC) 40 MG capsule TAKE 1 CAPSULE BY MOUTH EVERY DAY, Normal      timolol (BETIMOL) 0 25 % ophthalmic solution 1-2 drops 2 (two) times a day, Historical Med      verapamil (CALAN) 80 mg tablet Take 1 tablet by mouth 4 (four) times a day, Starting Tue 7/17/2018, Historical Med           No discharge procedures on file      PDMP Review     None          ED Provider  Electronically Signed by           Mary Lamb PA-C  05/26/21 2174

## 2021-10-26 ENCOUNTER — IMMUNIZATIONS (OUTPATIENT)
Dept: FAMILY MEDICINE CLINIC | Facility: HOSPITAL | Age: 71
End: 2021-10-26

## 2021-10-26 DIAGNOSIS — Z23 ENCOUNTER FOR IMMUNIZATION: Primary | ICD-10-CM

## 2021-10-26 PROCEDURE — 0001A COVID-19 PFIZER VACC 0.3 ML: CPT

## 2021-10-26 PROCEDURE — 91300 COVID-19 PFIZER VACC 0.3 ML: CPT

## 2022-04-15 ENCOUNTER — IMMUNIZATIONS (OUTPATIENT)
Dept: FAMILY MEDICINE CLINIC | Facility: HOSPITAL | Age: 72
End: 2022-04-15

## 2022-04-15 PROCEDURE — 0054A COVID-19 PFIZER VACC TRIS-SUCROSE GRAY CAP 0.3 ML: CPT

## 2022-04-15 PROCEDURE — 91305 COVID-19 PFIZER VACC TRIS-SUCROSE GRAY CAP 0.3 ML: CPT

## 2022-06-29 ENCOUNTER — TELEPHONE (OUTPATIENT)
Dept: GASTROENTEROLOGY | Facility: CLINIC | Age: 72
End: 2022-06-29

## 2022-08-02 ENCOUNTER — TELEPHONE (OUTPATIENT)
Dept: NEUROSURGERY | Facility: CLINIC | Age: 72
End: 2022-08-02

## 2022-08-02 NOTE — TELEPHONE ENCOUNTER
8/2/22 PT CALLED AND LMOM ON NEWPT LINE FOR 2ND OPINION  PT LAST SAW DR HU [facility]  Bhakti Edward 20 7/20/22  UNABLE TO FIND ANY RECENT LSPINE IMAGING IN CHART  CALLED PT AND LMOM FOR HIM TO CB TO DISCUSS

## 2022-09-01 ENCOUNTER — OFFICE VISIT (OUTPATIENT)
Dept: NEUROSURGERY | Facility: CLINIC | Age: 72
End: 2022-09-01
Payer: MEDICARE

## 2022-09-01 VITALS
HEIGHT: 67 IN | SYSTOLIC BLOOD PRESSURE: 118 MMHG | HEART RATE: 69 BPM | DIASTOLIC BLOOD PRESSURE: 64 MMHG | TEMPERATURE: 98.3 F | RESPIRATION RATE: 16 BRPM | BODY MASS INDEX: 30.45 KG/M2 | WEIGHT: 194 LBS

## 2022-09-01 DIAGNOSIS — M54.50 LOWER BACK PAIN: ICD-10-CM

## 2022-09-01 PROCEDURE — 99202 OFFICE O/P NEW SF 15 MIN: CPT | Performed by: PHYSICIAN ASSISTANT

## 2022-09-01 RX ORDER — ASPIRIN 325 MG
325 TABLET ORAL DAILY
COMMUNITY

## 2022-09-01 RX ORDER — PANTOPRAZOLE SODIUM 40 MG/1
40 TABLET, DELAYED RELEASE ORAL DAILY
COMMUNITY

## 2022-09-01 NOTE — PROGRESS NOTES
Neurosurgery Office Note  Isabel Carroll 74 y o  male MRN: [id number]      Assessment/Plan     Lumbar spondylosis  · Presents for second opinion of low back pain   · Evaluated at [facility] neurosurgery and recommended to under go L4-5 and L5-S1 ALIF  Imaging:   · MRI lumbar spine 7/23/21: multilevel degenerative disease wihtout significant central or foraminal stenosis  Moderate foraminal narrowing bilaterally at L4-5  Plan:   · Continue to monitor symptoms   · Reviewed imaging with patient in room  · Patient underwent CHARO in October, novemeber and December with December injection at L5 on right and providing extended relief of radiculopathy  · Continue taking pain medication as prescribed  · Referral placed to pain management   · Patient can consider RFA/MBB for isolated back pain   · Can consider repeat CHARO in future should his right leg pain return   · Can consider SCS trial as well for back pain   · Continue to be active with walking and low impact activities as tolerated  · Recommend against surgical intervention at this time  No evidence of spondylolisthesis or anterolisthesis on imaging to suggest need for fixation and fusion  · Patient can follow up as needed with [facility]surgical office  Encouraged to call with questions or concerns         Diagnoses and all orders for this visit:    Lower back pain  -     Ambulatory Referral to Neurosurgery  -     Ambulatory Referral to Pain Management; Future    Other orders  -     pantoprazole (PROTONIX) 40 mg tablet; Take 40 mg by mouth daily  -     aspirin 325 mg tablet; Take 325 mg by mouth daily          I spent 30 minutes with the patient today in which >50% of the time was spent counseling/coordination of care regarding diagnosis, imaging review, symptoms and treatment plan       CHIEF COMPLAINT    Chief Complaint   Patient presents with    Consult     2nd opinion, Lumbar spine evaluation       HISTORY    History of Present Illness     74 y o  year old male who presents to the outpatient neurosurgical office as a 2nd opinion  Patient was originally seen and following with [facility] Neurosurgery  He has had 2 prior anterior cervical diskectomies with Dr Niles Martin  He recently was evaluated and recommended to undergo a 2 level anterior lumbar interbody fusion at L4-5 and L5-S1  Patient primary concern incomplete at this time as his back pain  He was having significant right leg radiculopathy but was being evaluated by an orthopedic surgeon in [place] and receiving injections  He states he received an L3 epidural in October, L4 epidural in November and an L5 epidural in December  After his December injection he had complete relief of his right leg pain  He states it still continues to be resolved and has not shown any evidence of recurrence  He is now greater than 8 months out from his injection  Patient's back pain is primarily centered across his low back without radiation  He feels slightly better with extension rather than flexion  He states with increased activity does promote increased pain  Stretching does provide some relief  It does inhibit some of his daily activities such as working around the house and golfing  Bowel or bladder dysfunction, weakness or sensation changes  See Discussion    REVIEW OF SYSTEMS    Review of Systems   Constitutional: Negative  HENT: Positive for hearing loss  Eyes: Negative  Respiratory: Negative  Cardiovascular:        Has cardiac stent   Gastrointestinal:        IBS  Recent GI infection   Endocrine: Negative  Genitourinary: Negative  Musculoskeletal: Positive for arthralgias and back pain  Skin: Negative  Allergic/Immunologic: Negative  Neurological: Negative for weakness and numbness  Hematological: Negative  Psychiatric/Behavioral: Negative            Meds/Allergies     Current Outpatient Medications   Medication Sig Dispense Refill    aspirin 325 mg tablet Take 325 mg by mouth daily      atorvastatin (LIPITOR) 80 mg tablet Take 80 mg by mouth daily      diclofenac (VOLTAREN) 75 mg EC tablet Take 75 mg by mouth 2 (two) times a day      pantoprazole (PROTONIX) 40 mg tablet Take 40 mg by mouth daily      VERAPAMIL HCL PO Take 180 mg by mouth 2 (two) times a day      meloxicam (MOBIC) 15 mg tablet       ofloxacin (OCUFLOX) 0 3 % ophthalmic solution       omeprazole (PriLOSEC) 40 MG capsule TAKE 1 CAPSULE BY MOUTH EVERY DAY 90 capsule 3    prasugrel (EFFIENT) tablet Take 10 mg by mouth daily      prednisoLONE acetate (PRED FORTE) 1 % ophthalmic suspension       timolol (BETIMOL) 0 25 % ophthalmic solution 1-2 drops 2 (two) times a day      timolol (TIMOPTIC) 0 5 % ophthalmic solution Administer 1 drop to both eyes 2 (two) times a day       No current facility-administered medications for this visit  No Known Allergies    PAST HISTORY    Past Medical History:   Diagnosis Date    Arthritis     CAD (coronary artery disease)     Chronic right-sided low back pain     GERD (gastroesophageal reflux disease)     Glaucoma     Hyperlipidemia     Hypertension     IBS (irritable colon syndrome)     Nosebleed     [facility]onsust[facility])     PVC (premature ventricular contraction)        Past Surgical History:   Procedure Laterality Date    [facility]IAC [facility]IZATION  2018    [facility]    CERVICAL SPINE SURGERY  2020    at [address]      [place] ESOPHAGOGASTRODUODENOSCOPY TRANSORAL DIAGNOSTIC N/A 2017    Procedure: EGD AND COLONOSCOPY;  Surgeon: Linda Green MD;  Location: [facility];   Service: Gastroenterology    TONSILLECTOMY         Social History     Tobacco Use    Smoking status: Former Smoker     Quit date:      Years since quittin 6    Smokeless tobacco: Never Used   Vaping Use    Vaping Use: Never used   Substance Use Topics    Alcohol use: Not Currently     Comment: infrequent    Drug use: No       Family History   Problem Relation Age of Onset    Heart disease Mother     Dementia Father     Cancer Father     Heart disease Father          Above history personally reviewed  EXAM    Vitals:Blood pressure 118/64, pulse 69, temperature 98 3 °F (36 8 °C), temperature source Tympanic, resp  rate 16, height 5' 7" (1 702 m), weight 88 kg (194 lb)  ,Body mass index is 30 38 kg/m²  Physical Exam  Constitutional:       Appearance: Normal appearance  He is well-developed  HENT:      Head: Normocephalic  Eyes:      Extraocular Movements: EOM normal    Neck:      Vascular: No JVD  Cardiovascular:      Rate and Rhythm: Normal rate  Pulmonary:      Effort: Pulmonary effort is normal    Musculoskeletal:         General: No tenderness or deformity  Normal range of motion  Cervical back: Normal range of motion and neck supple  Comments: Subjective pain in right back, but no tenderness on examination   Skin:     General: Skin is warm and dry  Neurological:      Mental Status: He is alert and oriented to person, place, and time  Cranial Nerves: No cranial nerve deficit  Sensory: No sensory deficit  Motor: No weakness  Gait: Gait is intact  Deep Tendon Reflexes: Reflexes are normal and symmetric  Reflex Scores:       Bicep reflexes are 2+ on the right side and 2+ on the left side  Brachioradialis reflexes are 2+ on the right side and 2+ on the left side  Patellar reflexes are 2+ on the right side and 2+ on the left side  Psychiatric:         Speech: Speech normal          Behavior: Behavior normal          Thought Content: Thought content normal          Neurologic Exam     Mental Status   Oriented to person, place, and time  Attention: normal    Speech: speech is normal   Level of consciousness: alert  Knowledge: good  Normal comprehension       Cranial Nerves     CN III, IV, VI   Extraocular motions are normal  Upgaze: normal  Downgaze: normal    CN VII   Facial expression full, symmetric  CN VIII   CN VIII normal    Hearing: intact    Motor Exam   Muscle bulk: normal  Right arm tone: normal  Left arm tone: normal  Right leg tone: normal  Left leg tone: normal    Strength   Right deltoid: 5/5  Left deltoid: 5/5  Right biceps: 5/5  Left biceps: 5/5  Right triceps: 5/5  Left triceps: 5/5  Right wrist flexion: 5/5  Left wrist flexion: 5/5  Right wrist extension: 5/5  Left wrist extension: 5/5  Right iliopsoas: 5/5  Left iliopsoas: 5/5  Right quadriceps: 5/5  Left quadriceps: 5/5  Right hamstrin/5  Left hamstrin/5  Right anterior tibial: 5/5  Left anterior tibial: 5/5  Right gastroc: 5/5  Left gastroc: 5/5    Sensory Exam   Light touch normal      Gait, Coordination, and Reflexes     Gait  Gait: normal    Tremor   Resting tremor: absent  Action tremor: absent    Reflexes   Right brachioradialis: 2+  Left brachioradialis: 2+  Right biceps: 2+  Left biceps: 2+  Right patellar: 2+  Left patellar: 2+  Right Fatima: absent  Left Fatima: absent  Right ankle clonus: absent  Left ankle clonus: absent        MEDICAL DECISION MAKING    Imaging Studies:     No results found  I have personally reviewed pertinent reports     and I have personally reviewed pertinent films in PACS

## 2022-09-01 NOTE — ASSESSMENT & PLAN NOTE
· Presents for second opinion of low back pain   · Evaluated at [facility] neurosurgery and recommended to under go L4-5 and L5-S1 ALIF  Imaging:   · MRI lumbar spine 7/23/21: multilevel degenerative disease wihtout significant central or foraminal stenosis  Moderate foraminal narrowing bilaterally at L4-5  Plan:   · Continue to monitor symptoms   · Reviewed imaging with patient in room  · Patient underwent CHARO in October, novemeber and December with December injection at L5 on right and providing extended relief of radiculopathy  · Continue taking pain medication as prescribed  · Referral placed to pain management   · Patient can consider RFA/MBB for isolated back pain   · Can consider repeat CHARO in future should his right leg pain return   · Can consider SCS trial as well for back pain   · Continue to be active with walking and low impact activities as tolerated  · Recommend against surgical intervention at this time  No evidence of spondylolisthesis or anterolisthesis on imaging to suggest need for fixation and fusion  · Patient can follow up as needed with [facility] office   Encouraged to call with questions or concerns

## 2022-09-08 NOTE — PROGRESS NOTES
Pain Medicine Follow-Up Note    Assessment:  1  Chronic right-sided low back pain without sciatica    2  Lumbar facet arthropathy        Plan:  Orders Placed This Encounter   Procedures    FL spine and pain procedure     Standing Status:   Future     Standing Expiration Date:   2026     Order Specific Question:   Reason for Exam:     Answer:   right L3-4, L4-5, and L5-S1 mbb#1     Order Specific Question:   Anticoagulant hold needed?      Answer:   no       New Medications Ordered This Visit   Medications    traMADol (ULTRAM) 50 mg tablet     Sig: tramadol 50 mg tablet   TAKE 1 TABLET BY MOUTH 3 TIMES DAILY    tobramycin-dexamethasone (TOBRADEX) ophthalmic suspension     Sig: INSTILL 1 DROP INTO BOTH EYES 4 TIMES A DAY FOR 7 DAYS    predniSONE 10 mg tablet     Sig: TAKE ONE (1) TABLET 4 TIMES A DAY FOR 2 DAYS THEN 3 TIMES A DAY FOR 2 DAYS THEN TAKE ONE (1) TABLET TWICE DAILY FOR 2 DAYS    pantoprazole (PROTONIX) 40 mg injection    oxyCODONE-acetaminophen (PERCOCET) 5-325 mg per tablet     Sig: Take 1 tablet by mouth every 6 (six) hours as needed    metroNIDAZOLE (FLAGYL) 500 mg tablet     Sig: TAKE ONE (1) TABLET 3 TIMES A DAY TAKING FIRST DOSE NOW    levofloxacin (LEVAQUIN) 500 mg tablet     Si mg daily    ketoconazole (NIZORAL) 2 % shampoo     Sig: WASH SCALP 3 TIMES WEEKLY    diazepam (VALIUM) 5 mg tablet     Sig: diazepam 5 mg tablet   TAKE 1 TABLET BY MOUTH 3 TIMES A DAY AS NEEDED FOR SPASM    BinaxNOW COVID-19 Ag Home Test KIT     Sig: REFER TO MANUFACTUER INSTRUCTIONS INCLUDED IN PACKAGING    clopidogrel (PLAVIX) 75 mg tablet     Sig: clopidogrel 75 mg tablet   TAKE 1 TABLET BY MOUTH EVERY DAY    cephalexin (KEFLEX) 500 mg capsule     Sig: Take 500 mg by mouth 3 (three) times a day    cefuroxime (CEFTIN) 500 mg tablet     Sig: cefuroxime axetil 500 mg tablet   TAKE 1 TABLET BY MOUTH TWICE A DAY WITH FOOD (ANTIBIOTIC)    atorvastatin (LIPITOR) 80 mg tablet     Sig: atorvastatin 80 mg tablet   TAKE 1 TABLET (80 MG TOTAL) BY MOUTH EVERY MORNING   aspirin (ECOTRIN LOW STRENGTH) 81 mg EC tablet     Sig: aspirin 81 mg tablet,delayed release   TAKE 1 TABLET (81 MG TOTAL) BY MOUTH DAILY  OVER THE COUNTER    aspirin 325 mg tablet     Sig: Take 325 mg by mouth    amoxicillin (AMOXIL) 500 mg capsule     Sig: Take 500 mg by mouth every 8 (eight) hours    amoxicillin (AMOXIL) 500 mg capsule     Sig: amoxicillin 500 mg capsule       My impressions and treatment recommendations were discussed in detail with the patient who verbalized understanding and had no further questions  This is a 75-year-old male who returns to our office with chief complaint of right-sided low back pain without radiation down the lower extremity  He was seen several years ago for axial low back pain  Since that time has had provocative discogram and saw Neurosurgery at [facility] and was recommended anterior lumbar interbody fusion at L4-5  He got 2nd opinion here at [facility] and is not recommended surgical therapy at this time  Upon examination, patient has notable pain with facet loading to the right in the setting of facet hypertrophy noted on imaging  He had previous radiofrequency ablation 2018, however it appears that the procedure may have been stopped due to significant pain  I recommend to the patient that he consider starting with right L3-4, L4-5, and L5-S1 medial branch block 1st   If notable relief, then will go down the road of radiofrequency ablation  Clinical signs pointing to facet mediated pain include pain lateralize to the right side and positive facet loading  Will also get his records from his previous pain management doctor from [facility], Dr Sonia Cueva  He has had numerous epidural injections with him  The last 1 was done in December with notable relief of his lower extremity pain  Does have spinal stenosis secondary to degenerative disease      [employer] Drug Monitoring Program report was reviewed and was appropriate       Complete risks and benefits including bleeding, infection, tissue reaction, nerve injury and allergic reaction were discussed  The approach was demonstrated using models and literature was provided  Verbal and written consent was obtained  Discharge instructions were provided  I personally saw and examined the patient and I agree with the above discussed plan of care  History of Present Illness:    Roz Palma is a 74 y o  male who presents to [facility] for interval re-evaluation of the above stated pain complaints  The patient has a past medical and chronic pain history as outlined in the assessment section  He was last seen on 11/10/2020 at which time he was recommended for discogram   Returns today with 5/10 pain  Worse in the morning and night  Pain is constant, dull/aching       Since last visit, had updated MRI of the lumbar spine which shows chronic degenerative changes  Has moderate canal stenosis at L4-5 with disc herniation and moderate to severe neural foraminal narrowing  Also has multilevel facet disease  Was seen by [facility] in April 2022  Was recommended for provocative discogram at the time at L2-3, L3-4, L4-5, and L5-S1  Appparently had concordant pain at L4-5 AND L5-S1, none at L3-4  Patient reports receiving and L3 epidural in October and L4 epidural November and L5 epidural in December  This was done by Dr Marline Perez in [facility]  Reports December injection provided complete relief of right leg pain  He saw Neurosurgery at an outside hospital in July 2022 was recommended anterior lumbar interbody fusion at L4-5  Had second opinion at [facility] and was not recommended surgery  Most Pain in back is right sided  Does not radiate down leg  Physical activity makes it worse  Feels it is worse with flexion  ,     Other than as stated above, the patient denies any interval changes in medications, medical condition, mental condition, symptoms, or allergies since the last office visit  Review of Systems:    Review of Systems   Respiratory: Negative for shortness of breath  Cardiovascular: Negative for chest pain  Gastrointestinal: Negative for constipation, diarrhea, nausea and vomiting  Musculoskeletal: Positive for arthralgias  Negative for gait problem, joint swelling and myalgias  Decreased ROM   Pain in lower back   Skin: Negative for rash  Neurological: Negative for dizziness, seizures and weakness  All other systems reviewed and are negative  Patient Active Problem List   Diagnosis    Hypertension, essential    Chest discomfort    Mixed hyperlipidemia    Gastroesophageal reflux disease without esophagitis    Chronic right-sided low back pain    Lumbar spondylosis    Irritable bowel syndrome with diarrhea    Right-sided epistaxis       Past Medical History:   Diagnosis Date    Arthritis     CAD (coronary artery disease)     Chronic right-sided low back pain     GERD (gastroesophageal reflux disease)     Glaucoma     Hyperlipidemia     Hypertension     IBS (irritable colon syndrome)     Nosebleed     NSVT (nonsustained ventricular tachycardia) (HCC)     PVC (premature ventricular contraction)        Past Surgical History:   Procedure Laterality Date    CARDIAC [facility]HETERIZATION  03/14/2018    [facility]    CERVICAL SPINE SURGERY  2020    at [address] [place] [address]  Oct-nov-dec2021    LAMINECTOMY      NASAL SEPTUM SURGERY      [place] ESOPHAGOGASTRODUODENOSCOPY TRANSORAL DIAGNOSTIC N/A 08/07/2017    Procedure: EGD AND COLONOSCOPY;  Surgeon: Elsa Mcconnell MD;  Location: [facility];   Service: Gastroenterology    TONSILLECTOMY         Family History   Problem Relation Age of Onset    Heart disease Mother     Dementia Father     Cancer Father     Heart disease Father        Social History Occupational History    Not on file   Tobacco Use    Smoking status: Former Smoker     Packs/day: 0 50     Years: 20 00     Pack years: 10 00     Types: Cigarettes     Quit date: 1989     Years since quittin 7    Smokeless tobacco: Never Used   Vaping Use    Vaping Use: Never used   Substance and Sexual Activity    Alcohol use: Yes     Comment: Occasional drinker    Drug use: No    Sexual activity: Not on file         Current Outpatient Medications:     aspirin 325 mg tablet, Take 325 mg by mouth daily, Disp: , Rfl:     aspirin 325 mg tablet, Take 325 mg by mouth, Disp: , Rfl:     atorvastatin (LIPITOR) 80 mg tablet, atorvastatin 80 mg tablet  TAKE 1 TABLET (80 MG TOTAL) BY MOUTH EVERY MORNING , Disp: , Rfl:     diclofenac (VOLTAREN) 75 mg EC tablet, Take 75 mg by mouth 2 (two) times a day, Disp: , Rfl:     oxyCODONE-acetaminophen (PERCOCET) 5-325 mg per tablet, Take 1 tablet by mouth every 6 (six) hours as needed, Disp: , Rfl:     pantoprazole (PROTONIX) 40 mg injection, , Disp: , Rfl:     traMADol (ULTRAM) 50 mg tablet, tramadol 50 mg tablet  TAKE 1 TABLET BY MOUTH 3 TIMES DAILY, Disp: , Rfl:     VERAPAMIL HCL PO, Take 180 mg by mouth 2 (two) times a day, Disp: , Rfl:     amoxicillin (AMOXIL) 500 mg capsule, Take 500 mg by mouth every 8 (eight) hours (Patient not taking: Reported on 2022), Disp: , Rfl:     amoxicillin (AMOXIL) 500 mg capsule, amoxicillin 500 mg capsule (Patient not taking: Reported on 2022), Disp: , Rfl:     aspirin (ECOTRIN LOW STRENGTH) 81 mg EC tablet, aspirin 81 mg tablet,delayed release  TAKE 1 TABLET (81 MG TOTAL) BY MOUTH DAILY   OVER THE COUNTER (Patient not taking: Reported on 2022), Disp: , Rfl:     atorvastatin (LIPITOR) 80 mg tablet, Take 80 mg by mouth daily (Patient not taking: Reported on 2022), Disp: , Rfl:     BinaxNOW COVID-19 Ag Home Test KIT, REFER TO MANUFACTUER INSTRUCTIONS INCLUDED IN PACKAGING (Patient not taking: Reported on 9/9/2022), Disp: , Rfl:     cefuroxime (CEFTIN) 500 mg tablet, cefuroxime axetil 500 mg tablet  TAKE 1 TABLET BY MOUTH TWICE A DAY WITH FOOD (ANTIBIOTIC) (Patient not taking: Reported on 9/9/2022), Disp: , Rfl:     cephalexin (KEFLEX) 500 mg capsule, Take 500 mg by mouth 3 (three) times a day (Patient not taking: Reported on 9/9/2022), Disp: , Rfl:     clopidogrel (PLAVIX) 75 mg tablet, clopidogrel 75 mg tablet  TAKE 1 TABLET BY MOUTH EVERY DAY (Patient not taking: Reported on 9/9/2022), Disp: , Rfl:     diazepam (VALIUM) 5 mg tablet, diazepam 5 mg tablet  TAKE 1 TABLET BY MOUTH 3 TIMES A DAY AS NEEDED FOR SPASM (Patient not taking: Reported on 9/9/2022), Disp: , Rfl:     ketoconazole (NIZORAL) 2 % shampoo, WASH SCALP 3 TIMES WEEKLY (Patient not taking: Reported on 9/9/2022), Disp: , Rfl:     levofloxacin (LEVAQUIN) 500 mg tablet, 500 mg daily (Patient not taking: Reported on 9/9/2022), Disp: , Rfl:     meloxicam (MOBIC) 15 mg tablet, , Disp: , Rfl:     metroNIDAZOLE (FLAGYL) 500 mg tablet, TAKE ONE (1) TABLET 3 TIMES A DAY TAKING FIRST DOSE NOW (Patient not taking: Reported on 9/9/2022), Disp: , Rfl:     ofloxacin (OCUFLOX) 0 3 % ophthalmic solution, , Disp: , Rfl:     omeprazole (PriLOSEC) 40 MG capsule, TAKE 1 CAPSULE BY MOUTH EVERY DAY, Disp: 90 capsule, Rfl: 3    pantoprazole (PROTONIX) 40 mg tablet, Take 40 mg by mouth daily (Patient not taking: Reported on 9/9/2022), Disp: , Rfl:     prasugrel (EFFIENT) tablet, Take 10 mg by mouth daily, Disp: , Rfl:     prednisoLONE acetate (PRED FORTE) 1 % ophthalmic suspension, , Disp: , Rfl:     predniSONE 10 mg tablet, TAKE ONE (1) TABLET 4 TIMES A DAY FOR 2 DAYS THEN 3 TIMES A DAY FOR 2 DAYS THEN TAKE ONE (1) TABLET TWICE DAILY FOR 2 DAYS (Patient not taking: Reported on 9/9/2022), Disp: , Rfl:     timolol (BETIMOL) 0 25 % ophthalmic solution, 1-2 drops 2 (two) times a day, Disp: , Rfl:     timolol (TIMOPTIC) 0 5 % ophthalmic solution, Administer 1 drop to both eyes 2 (two) times a day, Disp: , Rfl:     tobramycin-dexamethasone (TOBRADEX) ophthalmic suspension, INSTILL 1 DROP INTO BOTH EYES 4 TIMES A DAY FOR 7 DAYS (Patient not taking: Reported on 9/9/2022), Disp: , Rfl:     No Known Allergies    Physical Exam:    /80 (BP Location: Left arm, Patient Position: Sitting, Cuff Size: Standard)   Pulse 69   Ht 5' 7" (1 702 m)   Wt 88 3 kg (194 lb 9 6 oz)   BMI 30 48 kg/m²     Constitutional:normal, well developed, well nourished, alert, in no distress and non-toxic and no overt pain behavior    Eyes:anicteric  HEENT:grossly intact  Neck:supple, symmetric, trachea midline and no masses   Pulmonary:even and unlabored  Cardiovascular:No edema or pitting edema present  Skin:Normal without rashes or lesions and well hydrated  Psychiatric:Mood and affect appropriate  Neurologic:Cranial Nerves II-XII grossly intact  Musculoskeletal: facet loading positive towards the right    Imaging  FL spine and pain procedure    (Results Pending)         Orders Placed This Encounter   Procedures    FL spine and pain procedure

## 2022-09-09 ENCOUNTER — OFFICE VISIT (OUTPATIENT)
Dept: PAIN MEDICINE | Facility: CLINIC | Age: 72
End: 2022-09-09
Payer: MEDICARE

## 2022-09-09 VITALS
BODY MASS INDEX: 30.54 KG/M2 | HEIGHT: 67 IN | DIASTOLIC BLOOD PRESSURE: 80 MMHG | SYSTOLIC BLOOD PRESSURE: 136 MMHG | WEIGHT: 194.6 LBS | HEART RATE: 69 BPM

## 2022-09-09 DIAGNOSIS — M54.50 CHRONIC RIGHT-SIDED LOW BACK PAIN WITHOUT SCIATICA: Primary | ICD-10-CM

## 2022-09-09 DIAGNOSIS — M47.816 LUMBAR FACET ARTHROPATHY: ICD-10-CM

## 2022-09-09 DIAGNOSIS — G89.29 CHRONIC RIGHT-SIDED LOW BACK PAIN WITHOUT SCIATICA: Primary | ICD-10-CM

## 2022-09-09 PROCEDURE — 99214 OFFICE O/P EST MOD 30 MIN: CPT | Performed by: STUDENT IN AN ORGANIZED HEALTH CARE EDUCATION/TRAINING PROGRAM

## 2022-09-09 RX ORDER — ASPIRIN 81 MG/1
TABLET ORAL
COMMUNITY

## 2022-09-09 RX ORDER — ATORVASTATIN CALCIUM 80 MG/1
TABLET, FILM COATED ORAL
COMMUNITY

## 2022-09-09 RX ORDER — OXYCODONE HYDROCHLORIDE AND ACETAMINOPHEN 5; 325 MG/1; MG/1
1 TABLET ORAL EVERY 6 HOURS PRN
COMMUNITY
Start: 2022-06-29

## 2022-09-09 RX ORDER — PANTOPRAZOLE SODIUM 40 MG/10ML
INJECTION, POWDER, LYOPHILIZED, FOR SOLUTION INTRAVENOUS
COMMUNITY

## 2022-09-09 RX ORDER — AMOXICILLIN 500 MG/1
CAPSULE ORAL
COMMUNITY

## 2022-09-09 RX ORDER — AMOXICILLIN 500 MG/1
500 CAPSULE ORAL EVERY 8 HOURS
COMMUNITY
Start: 2022-06-30

## 2022-09-09 RX ORDER — CEPHALEXIN 500 MG/1
500 CAPSULE ORAL 3 TIMES DAILY
COMMUNITY
Start: 2022-08-27

## 2022-09-09 RX ORDER — METRONIDAZOLE 500 MG/1
TABLET ORAL
COMMUNITY
Start: 2022-08-23

## 2022-09-09 RX ORDER — CLOPIDOGREL BISULFATE 75 MG/1
TABLET ORAL
COMMUNITY

## 2022-09-09 RX ORDER — COVID-19 MOLECULAR TEST ASSAY
KIT MISCELLANEOUS
COMMUNITY
Start: 2022-07-31

## 2022-09-09 RX ORDER — CEFUROXIME AXETIL 500 MG/1
TABLET ORAL
COMMUNITY

## 2022-09-09 RX ORDER — TRAMADOL HYDROCHLORIDE 50 MG/1
TABLET ORAL
COMMUNITY

## 2022-09-09 RX ORDER — TOBRAMYCIN AND DEXAMETHASONE 3; 1 MG/ML; MG/ML
SUSPENSION/ DROPS OPHTHALMIC
COMMUNITY
Start: 2022-07-18

## 2022-09-09 RX ORDER — KETOCONAZOLE 20 MG/ML
SHAMPOO TOPICAL
COMMUNITY
Start: 2022-08-27

## 2022-09-09 RX ORDER — LEVOFLOXACIN 500 MG/1
500 TABLET, FILM COATED ORAL DAILY
COMMUNITY
Start: 2022-08-01

## 2022-09-09 RX ORDER — PREDNISONE 10 MG/1
TABLET ORAL
COMMUNITY
Start: 2022-08-01

## 2022-09-09 RX ORDER — ASPIRIN 325 MG
325 TABLET ORAL
COMMUNITY

## 2022-09-09 RX ORDER — DIAZEPAM 5 MG/1
TABLET ORAL
COMMUNITY

## 2022-09-26 ENCOUNTER — TELEPHONE (OUTPATIENT)
Dept: PAIN MEDICINE | Facility: CLINIC | Age: 72
End: 2022-09-26

## 2022-09-26 NOTE — TELEPHONE ENCOUNTER
S/w pt and scheduled MBB for 10/18/22 930 am arrival  Gave pre procedure instructions and  policy, sent thru [facility] also

## 2022-10-11 ENCOUNTER — TELEPHONE (OUTPATIENT)
Dept: PAIN MEDICINE | Facility: CLINIC | Age: 72
End: 2022-10-11

## 2022-10-11 NOTE — TELEPHONE ENCOUNTER
Caller: patient    Doctor: brittanie    Reason for call: cancel procedure, will call back to ericka    Call back#: [phone]

## 2022-10-24 ENCOUNTER — HOSPITAL ENCOUNTER (OUTPATIENT)
Dept: MRI IMAGING | Facility: HOSPITAL | Age: 72
Discharge: HOME | End: 2022-10-24
Payer: MEDICARE

## 2022-10-24 DIAGNOSIS — M48.00 SPINAL STENOSIS, UNSPECIFIED SPINAL REGION: ICD-10-CM

## 2022-10-24 PROCEDURE — G1004 CDSM NDSC: HCPCS

## 2022-10-24 PROCEDURE — 72148 MRI LUMBAR SPINE W/O DYE: CPT

## 2024-01-24 ENCOUNTER — APPOINTMENT (OUTPATIENT)
Dept: LAB | Facility: HOSPITAL | Age: 74
End: 2024-01-24
Payer: MEDICARE

## 2024-01-24 DIAGNOSIS — R53.81 DEBILITY: ICD-10-CM

## 2024-01-24 DIAGNOSIS — M25.50 PAIN IN JOINT, MULTIPLE SITES: ICD-10-CM

## 2024-01-24 DIAGNOSIS — A69.20 LYME DISEASE: Primary | ICD-10-CM

## 2024-01-24 DIAGNOSIS — R79.9 ABNORMAL BLOOD CHEMISTRY: ICD-10-CM

## 2024-01-24 LAB
ALBUMIN SERPL BCP-MCNC: 4.2 G/DL (ref 3.5–5)
ALP SERPL-CCNC: 72 U/L (ref 34–104)
ALT SERPL W P-5'-P-CCNC: 42 U/L (ref 7–52)
ANION GAP SERPL CALCULATED.3IONS-SCNC: 7 MMOL/L
AST SERPL W P-5'-P-CCNC: 20 U/L (ref 13–39)
B BURGDOR IGG+IGM SER QL IA: NEGATIVE
BASOPHILS # BLD AUTO: 0.03 THOUSANDS/ÂΜL (ref 0–0.1)
BASOPHILS NFR BLD AUTO: 0 % (ref 0–1)
BILIRUB SERPL-MCNC: 0.4 MG/DL (ref 0.2–1)
BUN SERPL-MCNC: 23 MG/DL (ref 5–25)
CALCIUM SERPL-MCNC: 9.4 MG/DL (ref 8.4–10.2)
CHLORIDE SERPL-SCNC: 110 MMOL/L (ref 96–108)
CHOLEST SERPL-MCNC: 200 MG/DL
CO2 SERPL-SCNC: 26 MMOL/L (ref 21–32)
CREAT SERPL-MCNC: 0.85 MG/DL (ref 0.6–1.3)
EOSINOPHIL # BLD AUTO: 0.23 THOUSAND/ÂΜL (ref 0–0.61)
EOSINOPHIL NFR BLD AUTO: 3 % (ref 0–6)
ERYTHROCYTE [DISTWIDTH] IN BLOOD BY AUTOMATED COUNT: 13.4 % (ref 11.6–15.1)
GFR SERPL CREATININE-BSD FRML MDRD: 85 ML/MIN/1.73SQ M
GLUCOSE P FAST SERPL-MCNC: 114 MG/DL (ref 65–99)
HCT VFR BLD AUTO: 45.6 % (ref 36.5–49.3)
HDLC SERPL-MCNC: 42 MG/DL
HGB BLD-MCNC: 14.9 G/DL (ref 12–17)
IMM GRANULOCYTES # BLD AUTO: 0.04 THOUSAND/UL (ref 0–0.2)
IMM GRANULOCYTES NFR BLD AUTO: 1 % (ref 0–2)
LDLC SERPL CALC-MCNC: 112 MG/DL (ref 0–100)
LYMPHOCYTES # BLD AUTO: 1.84 THOUSANDS/ÂΜL (ref 0.6–4.47)
LYMPHOCYTES NFR BLD AUTO: 23 % (ref 14–44)
MCH RBC QN AUTO: 29.6 PG (ref 26.8–34.3)
MCHC RBC AUTO-ENTMCNC: 32.7 G/DL (ref 31.4–37.4)
MCV RBC AUTO: 91 FL (ref 82–98)
MONOCYTES # BLD AUTO: 0.72 THOUSAND/ÂΜL (ref 0.17–1.22)
MONOCYTES NFR BLD AUTO: 9 % (ref 4–12)
NEUTROPHILS # BLD AUTO: 5.05 THOUSANDS/ÂΜL (ref 1.85–7.62)
NEUTS SEG NFR BLD AUTO: 64 % (ref 43–75)
NONHDLC SERPL-MCNC: 158 MG/DL
NRBC BLD AUTO-RTO: 0 /100 WBCS
PLATELET # BLD AUTO: 268 THOUSANDS/UL (ref 149–390)
PMV BLD AUTO: 9.5 FL (ref 8.9–12.7)
POTASSIUM SERPL-SCNC: 4.5 MMOL/L (ref 3.5–5.3)
PROT SERPL-MCNC: 6.8 G/DL (ref 6.4–8.4)
PSA SERPL-MCNC: 1.76 NG/ML (ref 0–4)
RBC # BLD AUTO: 5.04 MILLION/UL (ref 3.88–5.62)
SODIUM SERPL-SCNC: 143 MMOL/L (ref 135–147)
T3 SERPL-MCNC: 1.1 NG/ML
T4 SERPL-MCNC: 9.53 UG/DL (ref 6.09–12.23)
TRIGL SERPL-MCNC: 231 MG/DL
TSH SERPL DL<=0.05 MIU/L-ACNC: 1.04 UIU/ML (ref 0.45–4.5)
URATE SERPL-MCNC: 4.6 MG/DL (ref 3.5–8.5)
WBC # BLD AUTO: 7.91 THOUSAND/UL (ref 4.31–10.16)

## 2024-01-24 PROCEDURE — 36415 COLL VENOUS BLD VENIPUNCTURE: CPT

## 2024-01-24 PROCEDURE — G0103 PSA SCREENING: HCPCS

## 2024-01-24 PROCEDURE — 84550 ASSAY OF BLOOD/URIC ACID: CPT

## 2024-01-24 PROCEDURE — 85025 COMPLETE CBC W/AUTO DIFF WBC: CPT

## 2024-01-24 PROCEDURE — 80061 LIPID PANEL: CPT

## 2024-01-24 PROCEDURE — 86618 LYME DISEASE ANTIBODY: CPT

## 2024-01-24 PROCEDURE — 84480 ASSAY TRIIODOTHYRONINE (T3): CPT

## 2024-01-24 PROCEDURE — 84443 ASSAY THYROID STIM HORMONE: CPT

## 2024-01-24 PROCEDURE — 84436 ASSAY OF TOTAL THYROXINE: CPT

## 2024-01-24 PROCEDURE — 80053 COMPREHEN METABOLIC PANEL: CPT

## 2024-02-06 ENCOUNTER — TELEPHONE (OUTPATIENT)
Age: 74
End: 2024-02-06

## 2024-02-06 ENCOUNTER — OFFICE VISIT (OUTPATIENT)
Dept: GASTROENTEROLOGY | Facility: CLINIC | Age: 74
End: 2024-02-06
Payer: MEDICARE

## 2024-02-06 VITALS
HEIGHT: 65 IN | HEART RATE: 99 BPM | BODY MASS INDEX: 32.36 KG/M2 | WEIGHT: 194.2 LBS | SYSTOLIC BLOOD PRESSURE: 120 MMHG | OXYGEN SATURATION: 97 % | DIASTOLIC BLOOD PRESSURE: 76 MMHG

## 2024-02-06 DIAGNOSIS — K21.9 GASTROESOPHAGEAL REFLUX DISEASE WITHOUT ESOPHAGITIS: ICD-10-CM

## 2024-02-06 DIAGNOSIS — K58.0 IRRITABLE BOWEL SYNDROME WITH DIARRHEA: ICD-10-CM

## 2024-02-06 DIAGNOSIS — K21.9 LARYNGOPHARYNGEAL REFLUX (LPR): ICD-10-CM

## 2024-02-06 DIAGNOSIS — Z86.010 HISTORY OF COLON POLYPS: Primary | ICD-10-CM

## 2024-02-06 DIAGNOSIS — R09.A2 GLOBUS SENSATION: ICD-10-CM

## 2024-02-06 PROCEDURE — 99204 OFFICE O/P NEW MOD 45 MIN: CPT | Performed by: PHYSICIAN ASSISTANT

## 2024-02-06 RX ORDER — PANTOPRAZOLE SODIUM 40 MG/1
40 TABLET, DELAYED RELEASE ORAL 2 TIMES DAILY
Qty: 180 TABLET | Refills: 0 | Status: SHIPPED | OUTPATIENT
Start: 2024-02-06

## 2024-02-06 NOTE — PROGRESS NOTES
[facility]  Zaheer Cade 73 y.o. male MRN: [id number]       CC: EGD/colonoscopy consult    HPI: Al is a 73-year-old male with history of hypertension, hyperlipidemia, and CAD on aspirin 325 mg.  Patient is here to reestablish with GI for throat clearing, increased mucus production, and globus sensation.  He denies dysphagia or dyne aphasia.  Patient reports that he has occasional wheezing. Chest x-ray performed in January was unremarkable.  He went to ENT, Jabier Irving PA-C, for consultation and was diagnosed with LPR.    Patient also reports long history of irritable bowel syndrome diagnosed by Dr. Concepcion in the 1980s.  Patient takes Imodium, which does work for him.  He reports that his bowel movements are usually in the morning or before bedtime.  Denies nocturnal diarrhea or signs or GI bleeding.    Patient's last EGD and colonoscopy were Dr. Concepcion 2017.  Colonoscopy with several polyps removed, likely was due for recall between 0433-3131.     Review of Systems:    CONSTITUTIONAL: Denies any fever, chills, or rigors. Good appetite, and no recent weight loss.  HEENT: No earache or tinnitus. Denies hearing loss or visual disturbances.  CARDIOVASCULAR: No chest pain or palpitations.   RESPIRATORY: Denies any cough, hemoptysis, shortness of breath or dyspnea on exertion.  GASTROINTESTINAL: As noted in the History of Present Illness.   GENITOURINARY: No problems with urination. Denies any hematuria or dysuria.  NEUROLOGIC: No dizziness or vertigo, denies headaches.   MUSCULOSKELETAL: Denies any muscle or joint pain.   SKIN: Denies skin rashes or itching.   ENDOCRINE: Denies excessive thirst. Denies intolerance to heat or cold.  PSYCHOSOCIAL: Denies depression or anxiety. Denies any recent memory loss.       Current Outpatient Medications   Medication Sig Dispense Refill    aspirin 325 mg tablet Take 325 mg by mouth      atorvastatin (LIPITOR) 80 mg tablet atorvastatin 80 mg tablet   TAKE  1 TABLET (80 MG TOTAL) BY MOUTH EVERY MORNING.      diclofenac (VOLTAREN) 75 mg EC tablet Take 75 mg by mouth 2 (two) times a day      ketoconazole (NIZORAL) 2 % shampoo WASH SCALP 3 TIMES WEEKLY (Patient not taking: Reported on 2022)      pantoprazole (PROTONIX) 40 mg tablet Take 1 pill daily in AM, at least 30 minutes before breakfast. 30 tablet 3    Restasis 0.05 % ophthalmic emulsion INSTILL 1 DROP INTO BOTH EYES TWICE A DAY      VERAPAMIL HCL PO Take 180 mg by mouth 2 (two) times a day       No current facility-administered medications for this visit.     Past Medical History:   Diagnosis Date    Arthritis     CAD (coronary artery disease)     Chronic right-sided low back pain     GERD (gastroesophageal reflux disease)     Glaucoma     Hyperlipidemia     Hypertension     IBS (irritable colon syndrome)     Nosebleed     NSVT (nonsustained ventricular tachycardia) ([facility])     PVC (premature ventricular contraction)      Past Surgical History:   Procedure Laterality Date    CARDIAC CATHETERIZATION  2018    [facility]    CERVICAL SPINE SURGERY      at [facility]    COLONOSCOPY      EPIDURAL BLOCK INJECTION  Oct-nov-dec2021    FL DISCOGRAM LUMBAR  2022    LAMINECTOMY      NASAL SEPTUM SURGERY      [place] ESOPHAGOGASTRODUODENOSCOPY TRANSORAL DIAGNOSTIC N/A 2017    Procedure: EGD AND COLONOSCOPY;  Surgeon: Devan Concepcion MD;  Location: [facility];  Service: Gastroenterology    TONSILLECTOMY       Social History     Socioeconomic History    Marital status:      Spouse name: Not on file    Number of children: Not on file    Years of education: Not on file    Highest education level: Not on file   Occupational History    Not on file   Tobacco Use    Smoking status: Former     Current packs/day: 0.00     Average packs/day: 0.5 packs/day for 20.0 years (10.0 ttl pk-yrs)     Types: Cigarettes     Start date: 1969     Quit date: 1989     Years since quittin.1    Smokeless tobacco: Never   Vaping  Use    Vaping status: Never Used   Substance and Sexual Activity    Alcohol use: Yes     Comment: Occasional drinker    Drug use: No    Sexual activity: Not on file   Other Topics Concern    Not on file   Social History Narrative    Not on file     Social Determinants of Health     Financial Resource Strain: Not on file   Food Insecurity: Not on file   Transportation Needs: Not on file   Physical Activity: Not on file   Stress: Not on file   Social Connections: Not on file   Intimate Partner Violence: Not on file   Housing Stability: Not on file     Family History   Problem Relation Age of Onset    Heart disease Mother     Dementia Father     Cancer Father     Heart disease Father             PHYSICAL EXAM:    There were no vitals filed for this visit.  General Appearance:   Alert and oriented x 3. Cooperative, and in no respiratory distress   HEENT:   Normocephalic, atraumatic, anicteric.     Neck:  Supple, symmetrical, trachea midline   Lungs:   Clear to auscultation bilaterally    Heart::   Regular rate and rhythm   Abdomen:   Soft, non-tender, non-distended; normal bowel sounds; no masses, no organomegaly    Genitalia:   Deferred    Rectal:   Deferred    Extremities:  No cyanosis, clubbing or edema    Pulses:  2+ and symmetric all extremities    Skin:  Skin color, texture, turgor normal, no rashes or lesions    Lymph nodes:  No palpable cervical or supraclavicular lymphadenopathy        Lab Results:   Results from last 6 Months   Lab Units 01/24/24  1009   WBC Thousand/uL 7.91   HEMOGLOBIN g/dL 14.9   HEMATOCRIT % 45.6   PLATELETS Thousands/uL 268   NEUTROS PCT % 64   LYMPHS PCT % 23   MONOS PCT % 9   EOS PCT % 3     Results from last 6 Months   Lab Units 01/24/24  1009   POTASSIUM mmol/L 4.5   CHLORIDE mmol/L 110*   CO2 mmol/L 26   BUN mg/dL 23   CREATININE mg/dL 0.85   CALCIUM mg/dL 9.4   ALK PHOS U/L 72   ALT U/L 42   AST U/L 20               Imaging Studies:   MRI lumbar spine wo contrast    Result Date:  "10/27/2022  Impression: 1.  Slightly enlarging disc herniation at L5-S1. 2.  Multilevel spondylosis most pronounced at L4-5 on the left, similar to the prior study. Workstation performed: [id number]       ASSESSMENT and PLAN:      1) Throat clearing, increased mucus production and LPR - Patient has been on pantoprazole chronically, which helped with his noncardiac chest pain symptoms many years ago.  However, still very symptomatic with regard to LPR.  Appreciate ENT recommendations.  - EGD to investigate  - We will increase his pantoprazole to twice daily for the next 8 weeks  - We will switch PPI entirely if twice daily dosing does not help his symptoms  - Consider GES in the futyre    2) IBS-D - Patient reports that he was diagnosed with IBS in the 1980s by Dr. Concepcion.  Patient reports that he is dependent on taking Imodium, but still works.  He has associated do bloating.  - Will trial Xifaxan course    3) Need for screening colonoscopy, personal history of colon polyps - Last colonoscopy was in 2017 with Dr. Concepcion.  He is overdue for recall.  We will schedule his recall colonoscopy.         Follow up after above procedures.      Portions of the record may have been created with voice recognition software.  Occasional wrong word or \"sound a like\" substitutions may have occurred due to the inherent limitations of voice recognition software.  Read the chart carefully and recognize, using context, where substitutions have occurred.  "

## 2024-02-06 NOTE — TELEPHONE ENCOUNTER
Patient calling to report that the prescription for Xifaxan is going to cost him over $600. He said Cindy Allen advised him to call back if it's too expensive.    Please call patient back at # [phone]

## 2024-02-07 NOTE — TELEPHONE ENCOUNTER
Reached out to Jordan Moreira Will let ot know via [employer]. Once I have samples her in the office I will give him a call.

## 2024-02-09 ENCOUNTER — NURSE TRIAGE (OUTPATIENT)
Age: 74
End: 2024-02-09

## 2024-02-09 ENCOUNTER — TELEPHONE (OUTPATIENT)
Dept: GASTROENTEROLOGY | Facility: CLINIC | Age: 74
End: 2024-02-09

## 2024-02-09 NOTE — TELEPHONE ENCOUNTER
"Reason for Disposition   Caller has NON-URGENT medicine question about med that PCP or specialist prescribed and triager unable to answer question    Answer Assessment - Initial Assessment Questions  1. NAME of MEDICATION: \"What medicine are you calling about?\"         Pt. Started on Xifaxan and was questioning the drug to drug interaction with being on verapamil, pt. Just wants re-assurance that its safe to take both medications , please advise    Protocols used: Medication Question Call-ADULT-[place]    "

## 2024-02-15 ENCOUNTER — NURSE TRIAGE (OUTPATIENT)
Age: 74
End: 2024-02-15

## 2024-02-15 NOTE — TELEPHONE ENCOUNTER
Called patient and got  . LMOM with message as per jeaneth to stop taking the XIFAXAN.  Left office # as well.....

## 2024-02-15 NOTE — TELEPHONE ENCOUNTER
"OV 2/6/24 RUBEN Allen    Patient called in he has had five days of the Xifaxan and since start has been experiencing vertigo, lightheadedness upon awakeing. It will improve once he \"moves around\". Medication has been effective noting improvement and not needing to use Imodium. Patient did take his morning dose today. I asked him to hold off on next dose until reply from provider.     I did review to sit on side of bed and dangle legs before standing up and hold on to something before standing for balance which he confirms he has been doing.    Please review and advise.  Reason for Disposition   Caller has NON-URGENT medicine question about med that PCP or specialist prescribed and triager unable to answer question    Answer Assessment - Initial Assessment Questions  1. NAME of MEDICATION: \"What medicine are you calling about?\"      Xifaxan  2. QUESTION: \"What is your question?\" (e.g., medication refill, side effect)      Possible reaction to medication  3. PRESCRIBING HCP: \"Who prescribed it?\" Reason: if prescribed by specialist, call should be referred to that group.      RUBEN Allen  4. SYMPTOMS: \"Do you have any symptoms?\"       Experiencing lightheadedness, vertigo symptoms  5. SEVERITY: If symptoms are present, ask \"Are they mild, moderate or severe?\"      Mild - occurs when awakening    Protocols used: Medication Question Call-ADULT-[place]    "

## 2024-05-01 DIAGNOSIS — K21.9 GASTROESOPHAGEAL REFLUX DISEASE WITHOUT ESOPHAGITIS: ICD-10-CM

## 2024-05-01 DIAGNOSIS — K21.9 LARYNGOPHARYNGEAL REFLUX (LPR): ICD-10-CM

## 2024-05-03 RX ORDER — PANTOPRAZOLE SODIUM 40 MG/1
TABLET, DELAYED RELEASE ORAL
Qty: 180 TABLET | Refills: 1 | Status: SHIPPED | OUTPATIENT
Start: 2024-05-03

## 2024-12-26 ENCOUNTER — HOSPITAL ENCOUNTER (EMERGENCY)
Facility: HOSPITAL | Age: 74
Discharge: HOME | End: 2024-12-26
Payer: MEDICARE

## 2024-12-26 ENCOUNTER — APPOINTMENT (EMERGENCY)
Dept: RADIOLOGY | Facility: HOSPITAL | Age: 74
End: 2024-12-26
Payer: MEDICARE

## 2024-12-26 VITALS
SYSTOLIC BLOOD PRESSURE: 170 MMHG | OXYGEN SATURATION: 92 % | DIASTOLIC BLOOD PRESSURE: 77 MMHG | TEMPERATURE: 99.9 F | HEART RATE: 75 BPM | RESPIRATION RATE: 18 BRPM | BODY MASS INDEX: 34.6 KG/M2 | WEIGHT: 207.89 LBS

## 2024-12-26 DIAGNOSIS — J40 BRONCHITIS: ICD-10-CM

## 2024-12-26 DIAGNOSIS — J32.9 SINUSITIS: Primary | ICD-10-CM

## 2024-12-26 LAB
ALBUMIN SERPL BCG-MCNC: 4.7 G/DL (ref 3.5–5)
ALP SERPL-CCNC: 81 U/L (ref 34–104)
ALT SERPL W P-5'-P-CCNC: 22 U/L (ref 7–52)
ANION GAP SERPL CALCULATED.3IONS-SCNC: 9 MMOL/L (ref 4–13)
AST SERPL W P-5'-P-CCNC: 16 U/L (ref 13–39)
ATRIAL RATE: 81 BPM
BASOPHILS # BLD AUTO: 0.02 THOUSANDS/ÂΜL (ref 0–0.1)
BASOPHILS NFR BLD AUTO: 0 % (ref 0–1)
BILIRUB SERPL-MCNC: 0.62 MG/DL (ref 0.2–1)
BNP SERPL-MCNC: 37 PG/ML (ref 0–100)
BUN SERPL-MCNC: 17 MG/DL (ref 5–25)
CALCIUM SERPL-MCNC: 9.2 MG/DL (ref 8.4–10.2)
CARDIAC TROPONIN I PNL SERPL HS: 4 NG/L (ref ?–50)
CHLORIDE SERPL-SCNC: 108 MMOL/L (ref 96–108)
CO2 SERPL-SCNC: 22 MMOL/L (ref 21–32)
CREAT SERPL-MCNC: 0.76 MG/DL (ref 0.6–1.3)
EOSINOPHIL # BLD AUTO: 0.04 THOUSAND/ÂΜL (ref 0–0.61)
EOSINOPHIL NFR BLD AUTO: 0 % (ref 0–6)
ERYTHROCYTE [DISTWIDTH] IN BLOOD BY AUTOMATED COUNT: 13.1 % (ref 11.6–15.1)
FLUAV AG UPPER RESP QL IA.RAPID: NEGATIVE
FLUBV AG UPPER RESP QL IA.RAPID: NEGATIVE
GFR SERPL CREATININE-BSD FRML MDRD: 88 ML/MIN/1.73SQ M
GLUCOSE SERPL-MCNC: 128 MG/DL (ref 65–140)
HCT VFR BLD AUTO: 44.7 % (ref 36.5–49.3)
HGB BLD-MCNC: 14.6 G/DL (ref 12–17)
IMM GRANULOCYTES # BLD AUTO: 0.03 THOUSAND/UL (ref 0–0.2)
IMM GRANULOCYTES NFR BLD AUTO: 0 % (ref 0–2)
LYMPHOCYTES # BLD AUTO: 0.8 THOUSANDS/ÂΜL (ref 0.6–4.47)
LYMPHOCYTES NFR BLD AUTO: 6 % (ref 14–44)
MCH RBC QN AUTO: 29.5 PG (ref 26.8–34.3)
MCHC RBC AUTO-ENTMCNC: 32.7 G/DL (ref 31.4–37.4)
MCV RBC AUTO: 90 FL (ref 82–98)
MONOCYTES # BLD AUTO: 0.64 THOUSAND/ÂΜL (ref 0.17–1.22)
MONOCYTES NFR BLD AUTO: 5 % (ref 4–12)
NEUTROPHILS # BLD AUTO: 11.59 THOUSANDS/ÂΜL (ref 1.85–7.62)
NEUTS SEG NFR BLD AUTO: 89 % (ref 43–75)
NRBC BLD AUTO-RTO: 0 /100 WBCS
P AXIS: 57 DEGREES
PLATELET # BLD AUTO: 188 THOUSANDS/UL (ref 149–390)
PMV BLD AUTO: 9.9 FL (ref 8.9–12.7)
POTASSIUM SERPL-SCNC: 3.8 MMOL/L (ref 3.5–5.3)
PR INTERVAL: 192 MS
PROT SERPL-MCNC: 7 G/DL (ref 6.4–8.4)
QRS AXIS: 46 DEGREES
QRSD INTERVAL: 78 MS
QT INTERVAL: 368 MS
QTC INTERVAL: 427 MS
RBC # BLD AUTO: 4.95 MILLION/UL (ref 3.88–5.62)
SARS-COV+SARS-COV-2 AG RESP QL IA.RAPID: NEGATIVE
SODIUM SERPL-SCNC: 139 MMOL/L (ref 135–147)
T WAVE AXIS: 46 DEGREES
VENTRICULAR RATE: 81 BPM
WBC # BLD AUTO: 13.12 THOUSAND/UL (ref 4.31–10.16)

## 2024-12-26 PROCEDURE — 84484 ASSAY OF TROPONIN QUANT: CPT

## 2024-12-26 PROCEDURE — 71045 X-RAY EXAM CHEST 1 VIEW: CPT

## 2024-12-26 PROCEDURE — 99285 EMERGENCY DEPT VISIT HI MDM: CPT

## 2024-12-26 PROCEDURE — 36415 COLL VENOUS BLD VENIPUNCTURE: CPT

## 2024-12-26 PROCEDURE — 99284 EMERGENCY DEPT VISIT MOD MDM: CPT

## 2024-12-26 PROCEDURE — 93005 ELECTROCARDIOGRAM TRACING: CPT

## 2024-12-26 PROCEDURE — 83880 ASSAY OF NATRIURETIC PEPTIDE: CPT

## 2024-12-26 PROCEDURE — 87804 INFLUENZA ASSAY W/OPTIC: CPT

## 2024-12-26 PROCEDURE — 87811 SARS-COV-2 COVID19 W/OPTIC: CPT

## 2024-12-26 PROCEDURE — 93010 ELECTROCARDIOGRAM REPORT: CPT | Performed by: INTERNAL MEDICINE

## 2024-12-26 PROCEDURE — 85025 COMPLETE CBC W/AUTO DIFF WBC: CPT

## 2024-12-26 PROCEDURE — 80053 COMPREHEN METABOLIC PANEL: CPT

## 2024-12-26 RX ORDER — PREDNISONE 20 MG/1
40 TABLET ORAL ONCE
Status: COMPLETED | OUTPATIENT
Start: 2024-12-26 | End: 2024-12-26

## 2024-12-26 RX ORDER — PREDNISONE 20 MG/1
40 TABLET ORAL DAILY
Qty: 10 TABLET | Refills: 0 | Status: SHIPPED | OUTPATIENT
Start: 2024-12-26 | End: 2024-12-31

## 2024-12-26 RX ADMIN — PREDNISONE 40 MG: 20 TABLET ORAL at 12:32

## 2024-12-26 RX ADMIN — AMOXICILLIN AND CLAVULANATE POTASSIUM 1 TABLET: 875; 125 TABLET, FILM COATED ORAL at 12:32

## 2024-12-26 NOTE — DISCHARGE INSTRUCTIONS
Given the worsening wheezing/sinus symptoms I believe an oral steroid may help. There is a high likelihood that this is viral but in the setting of worsening after 1 week along with the chronic sinus symptoms you can try a course of antibiotics with it as well. I also recommend taking a daily antihistamine such as cetirizine or loratadine.     If you notice no significant improvement, or further trouble breathing, fevers, vomiting, or other concerns then please go to your primary care or return to the ED for evaluation.

## 2024-12-26 NOTE — ED PROVIDER NOTES
ED Disposition       None          Assessment & Plan   {Hyperlinks  Risk Stratification - NIHSS - HEART SCORE - Fill out sepsis note and make sure you call [id number] if severe or septic shock:[id number]}    Medical Decision Making  Amount and/or Complexity of Data Reviewed  Labs: ordered.  Radiology: ordered.        ED Course as of 12/26/24 1137   Thu Dec 26, 2024   1136 WBC(!): 13.12       Medications - No data to display    ED Risk Strat Scores                                              History of Present Illness   {Hyperlinks  History (Med, Surg, Fam, Social) - Current Medications - Allergies  :[id number]}    Chief Complaint   Patient presents with   • Shortness of Breath     Increased shortness of breath on exertion for months. States that chest has been bothering for since coughing. Patient noted to have increased work of breathing while talking.        Past Medical History:   Diagnosis Date   • Arthritis    • CAD (coronary artery disease)    • Chronic right-sided low back pain    • GERD (gastroesophageal reflux disease)    • Glaucoma    • Hyperlipidemia    • Hypertension    • IBS (irritable colon syndrome)    • Nosebleed    • NSVT (nonsustained ventricular tachycardia) ([facility])    • PVC (premature ventricular contraction)       Past Surgical History:   Procedure Laterality Date   • CARDIAC CATHETERIZATION  03/14/2018    [facility]   • CERVICAL SPINE SURGERY  2020    at [facility]   • COLONOSCOPY     • EPIDURAL BLOCK INJECTION  Oct-nov-dec2021   • FL DISCOGRAM LUMBAR  6/29/2022   • LAMINECTOMY     • NASAL SEPTUM SURGERY     • NV ESOPHAGOGASTRODUODENOSCOPY TRANSORAL DIAGNOSTIC N/A 08/07/2017    Procedure: EGD AND COLONOSCOPY;  Surgeon: Devan Concepcion MD;  Location: [facility];  Service: Gastroenterology   • TONSILLECTOMY        Family History   Problem Relation Age of Onset   • Heart disease Mother    • Dementia Father    • Cancer Father    • Heart disease Father       Social History     Tobacco Use   • Smoking status:  Former     Current packs/day: 0.00     Average packs/day: 0.5 packs/day for 20.0 years (10.0 ttl pk-yrs)     Types: Cigarettes     Start date: 1969     Quit date: 1989     Years since quittin.0   • Smokeless tobacco: Never   Vaping Use   • Vaping status: Never Used   Substance Use Topics   • Alcohol use: Yes     Comment: Occasional drinker   • Drug use: No      E-Cigarette/Vaping   • E-Cigarette Use Never User       E-Cigarette/Vaping Substances   • Nicotine No    • THC No    • CBD No    • Flavoring No    • Other No    • Unknown No       I have reviewed and agree with the history as documented.     74M presenting to the ED due to acute and chronic complaints. Notes that acutely he has had a cough, increased phlegm, ear stuffiness, and worsening sinus congestion. Has had post nasal drip and sinus problems for a long time, has had 3 rhinoplasties. Notes that he has been using his albuterol/nebulizer more frequently at night time due to wheezing and chest congestion. Hasn't been able to sleep well due to this. Chronically also having exertional dyspnea that hasn't changed significantly. Denies f/c, n/v, abd pain, or other complaints.         Review of Systems   All other systems reviewed and are negative.          Objective   {Hyperlinks  Historical Vitals - Historical Labs - Chart Review/Microbiology - Last Echo - Code Status  :[id number]}    ED Triage Vitals [24 1050]   Temperature Pulse Blood Pressure Respirations SpO2 Patient Position - Orthostatic VS   99.9 °F (37.7 °C) 82 170/77 18 94 % Sitting      Temp Source Heart Rate Source BP Location FiO2 (%) Pain Score    Temporal Monitor Left arm -- --      Vitals      Date and Time Temp Pulse SpO2 Resp BP Pain Score FACES Pain Rating User   24 1050 99.9 °F (37.7 °C) 82 94 % 18 170/77 -- -- CT            Physical Exam  Constitutional:       General: He is not in acute distress.     Appearance: Normal appearance. He is not ill-appearing or  toxic-appearing.   HENT:      Head: Normocephalic and atraumatic.      Right Ear: Ear canal and external ear normal. There is no impacted cerumen.      Left Ear: Tympanic membrane, ear canal and external ear normal. There is no impacted cerumen.      Ears:      Comments: Bulging R TM.      Nose: Congestion present.      Mouth/Throat:      Mouth: Mucous membranes are moist.   Eyes:      Extraocular Movements: Extraocular movements intact.   Cardiovascular:      Rate and Rhythm: Normal rate and regular rhythm.   Pulmonary:      Effort: Pulmonary effort is normal.      Breath sounds: Rhonchi present. No wheezing.   Abdominal:      Palpations: Abdomen is soft.   Skin:     General: Skin is warm.   Neurological:      General: No focal deficit present.      Mental Status: He is alert.   Psychiatric:         Mood and Affect: Mood normal.         Results Reviewed       Procedure Component Value Units Date/Time    FLU/COVID Rapid Antigen (30 min. TAT) - Preferred screening test in ED [[id number]]     Lab Status: No result Specimen: Nares from Nose     CBC and differential [[id number]]     Lab Status: No result Specimen: Blood     Comprehensive metabolic panel [[id number]]     Lab Status: No result Specimen: Blood     HS Troponin 0hr (reflex protocol) [[id number]]     Lab Status: No result Specimen: Blood     B-Type Natriuretic Peptide(BNP) [[id number]]     Lab Status: No result Specimen: Blood             XR chest 1 view portable    (Results Pending)       ECG 12 Lead Documentation Only    Date/Time: 12/26/2024 11:17 AM    Performed by: Manny Romero MD  Authorized by: Manny Romero MD    ECG reviewed by me, the ED Provider: yes    Patient location:  ED  Previous ECG:     Previous ECG:  Unavailable  Interpretation:     Interpretation: normal    Rate:     ECG rate assessment: normal    Rhythm:     Rhythm: sinus rhythm    Ectopy:     Ectopy: none    QRS:     QRS axis:  Normal    QRS intervals:  Normal  Conduction:      Conduction: normal    ST segments:     ST segments:  Normal  T waves:     T waves: normal        ED Medication and Procedure Management   Prior to Admission Medications   Prescriptions Last Dose Informant Patient Reported? Taking?   Restasis 0.05 % ophthalmic emulsion  Self Yes No   Sig: INSTILL 1 DROP INTO BOTH EYES TWICE A DAY   VERAPAMIL HCL PO  Self Yes No   Sig: Take 180 mg by mouth 2 (two) times a day   aspirin 325 mg tablet  Self Yes No   Sig: Take 325 mg by mouth   atorvastatin (LIPITOR) 80 mg tablet  Self Yes No   Sig: atorvastatin 80 mg tablet   TAKE 1 TABLET (80 MG TOTAL) BY MOUTH EVERY MORNING.   diclofenac (VOLTAREN) 75 mg EC tablet  Self Yes No   Sig: Take 75 mg by mouth 2 (two) times a day   ketoconazole (NIZORAL) 2 % shampoo  Self Yes No   Sig: WASH SCALP 3 TIMES WEEKLY   Patient not taking: Reported on 9/9/2022   pantoprazole (PROTONIX) 40 mg tablet   No No   Sig: TAKE 1 TABLET BY MOUTH 2X A DAY (1TAB 30-60 MIN BEFORE BREAKFAST AND 1TAB 30-60 MIN BEFORE DINNER)      Facility-Administered Medications: None     Patient's Medications   Discharge Prescriptions    No medications on file     No discharge procedures on file.  ED SEPSIS DOCUMENTATION          accurate only with a steady state creatinine    FLU/COVID Rapid Antigen (30 min. TAT) - Preferred screening test in ED [[id number]]  (Normal) Collected: 12/26/24 1127    Lab Status: Final result Specimen: Nares from Nose Updated: 12/26/24 1153     SARS COV Rapid Antigen Negative     Influenza A Rapid Antigen Negative     Influenza B Rapid Antigen Negative    Narrative:      This test has been performed using the [employer] Sirena 2 FLU+SARS Antigen test under the Emergency Use Authorization (EUA). This test has been validated by the  and verified by the performing laboratory. The Sirena uses lateral flow immunofluorescent sandwich assay to detect SARS-COV, Influenza A and Influenza B Antigen.     The [employer]idel Sirena 2 SARS Antigen test does not differentiate between SARS-CoV and SARS-CoV-2.     Negative results are presumptive and may be confirmed with a molecular assay, if necessary, for patient management. Negative results do not rule out SARS-CoV-2 or influenza infection and should not be used as the sole basis for treatment or patient management decisions. A negative test result may occur if the level of antigen in a sample is below the limit of detection of this test.     Positive results are indicative of the presence of viral antigens, but do not rule out bacterial infection or co-infection with other viruses.     All test results should be used as an adjunct to clinical observations and other information available to the provider.    FOR PEDIATRIC PATIENTS - copy/paste COVID Guidelines URL to browser: [email or web address]    CBC and differential [[id number]]  (Abnormal) Collected: 12/26/24 1127    Lab Status: Final result Specimen: Blood from Arm, Right Updated: 12/26/24 1135     WBC 13.12 Thousand/uL      RBC 4.95 Million/uL      Hemoglobin 14.6 g/dL      Hematocrit 44.7 %      MCV 90 fL      MCH 29.5 pg      MCHC 32.7 g/dL      RDW 13.1 %      MPV 9.9  fL      Platelets 188 Thousands/uL      nRBC 0 /100 WBCs      Segmented % 89 %      Immature Grans % 0 %      Lymphocytes % 6 %      Monocytes % 5 %      Eosinophils Relative 0 %      Basophils Relative 0 %      Absolute Neutrophils 11.59 Thousands/µL      Absolute Immature Grans 0.03 Thousand/uL      Absolute Lymphocytes 0.80 Thousands/µL      Absolute Monocytes 0.64 Thousand/µL      Eosinophils Absolute 0.04 Thousand/µL      Basophils Absolute 0.02 Thousands/µL             XR chest 1 view portable   ED Interpretation by Manny Romero MD (12/26 1220)   No acute cardiopulmonary disease      Final Interpretation by Sukumar Rizzo MD (12/26 3918)      No acute cardiopulmonary disease.            Workstation performed: [id number]             ECG 12 Lead Documentation Only    Date/Time: 12/26/2024 11:17 AM    Performed by: Manny Romero MD  Authorized by: Manny Romero MD    ECG reviewed by me, the ED Provider: yes    Patient location:  ED  Previous ECG:     Previous ECG:  Unavailable  Interpretation:     Interpretation: normal    Rate:     ECG rate assessment: normal    Rhythm:     Rhythm: sinus rhythm    Ectopy:     Ectopy: none    QRS:     QRS axis:  Normal    QRS intervals:  Normal  Conduction:     Conduction: normal    ST segments:     ST segments:  Normal  T waves:     T waves: normal        ED Medication and Procedure Management   Prior to Admission Medications   Prescriptions Last Dose Informant Patient Reported? Taking?   Restasis 0.05 % ophthalmic emulsion  Self Yes No   Sig: INSTILL 1 DROP INTO BOTH EYES TWICE A DAY   VERAPAMIL HCL PO  Self Yes No   Sig: Take 180 mg by mouth 2 (two) times a day   aspirin 325 mg tablet  Self Yes No   Sig: Take 325 mg by mouth   atorvastatin (LIPITOR) 80 mg tablet  Self Yes No   Sig: atorvastatin 80 mg tablet   TAKE 1 TABLET (80 MG TOTAL) BY MOUTH EVERY MORNING.   diclofenac (VOLTAREN) 75 mg EC tablet  Self Yes No   Sig: Take 75 mg by mouth 2 (two) times  a day   ketoconazole (NIZORAL) 2 % shampoo  Self Yes No   Sig: WASH SCALP 3 TIMES WEEKLY   Patient not taking: Reported on 9/9/2022   pantoprazole (PROTONIX) 40 mg tablet   No No   Sig: TAKE 1 TABLET BY MOUTH 2X A DAY (1TAB 30-60 MIN BEFORE BREAKFAST AND 1TAB 30-60 MIN BEFORE DINNER)      Facility-Administered Medications: None     Discharge Medication List as of 12/26/2024 12:11 PM        START taking these medications    Details   amoxicillin-clavulanate (AUGMENTIN) 875-125 mg per tablet Take 1 tablet by mouth every 12 (twelve) hours for 7 days, Starting Thu 12/26/2024, Until Thu 1/2/2025, Normal      predniSONE 20 mg tablet Take 2 tablets (40 mg total) by mouth daily for 5 days, Starting u 12/26/2024, Until Tue 12/31/2024, Normal           CONTINUE these medications which have NOT CHANGED    Details   aspirin 325 mg tablet Take 325 mg by mouth, Historical Med      atorvastatin (LIPITOR) 80 mg tablet atorvastatin 80 mg tablet   TAKE 1 TABLET (80 MG TOTAL) BY MOUTH EVERY MORNING., Historical Med      diclofenac (VOLTAREN) 75 mg EC tablet Take 75 mg by mouth 2 (two) times a day, Historical Med      ketoconazole (NIZORAL) 2 % shampoo WASH SCALP 3 TIMES WEEKLY, Historical Med      pantoprazole (PROTONIX) 40 mg tablet TAKE 1 TABLET BY MOUTH 2X A DAY (1TAB 30-60 MIN BEFORE BREAKFAST AND 1TAB 30-60 MIN BEFORE DINNER), Normal      Restasis 0.05 % ophthalmic emulsion INSTILL 1 DROP INTO BOTH EYES TWICE A DAY, Historical Med      VERAPAMIL HCL PO Take 180 mg by mouth 2 (two) times a day, Starting Tue 7/17/2018, Historical Med           No discharge procedures on file.  ED SEPSIS DOCUMENTATION   Time reflects when diagnosis was documented in both MDM as applicable and the Disposition within this note       Time User Action Codes Description Comment    12/26/2024 12:09 PM Manny Romero [J32.9] Sinusitis     12/26/2024 12:09 PM Manny Romero [J40] Bronchitis                  Manny Romero MD  12/30/24  [id number]

## 2025-01-21 ENCOUNTER — OFFICE VISIT (OUTPATIENT)
Age: 75
End: 2025-01-21
Payer: MEDICARE

## 2025-01-21 ENCOUNTER — PREP FOR PROCEDURE (OUTPATIENT)
Age: 75
End: 2025-01-21

## 2025-01-21 VITALS
BODY MASS INDEX: 34.32 KG/M2 | HEART RATE: 70 BPM | SYSTOLIC BLOOD PRESSURE: 142 MMHG | HEIGHT: 65 IN | OXYGEN SATURATION: 96 % | DIASTOLIC BLOOD PRESSURE: 80 MMHG | WEIGHT: 206 LBS

## 2025-01-21 DIAGNOSIS — K21.9 GASTROESOPHAGEAL REFLUX DISEASE WITHOUT ESOPHAGITIS: Primary | ICD-10-CM

## 2025-01-21 DIAGNOSIS — Z86.0100 HISTORY OF COLON POLYPS: ICD-10-CM

## 2025-01-21 PROCEDURE — 99214 OFFICE O/P EST MOD 30 MIN: CPT | Performed by: INTERNAL MEDICINE

## 2025-01-21 RX ORDER — VERAPAMIL HYDROCHLORIDE 180 MG/1
1 TABLET, EXTENDED RELEASE ORAL 2 TIMES DAILY
COMMUNITY
Start: 2024-11-28

## 2025-01-21 RX ORDER — AMOXICILLIN 500 MG/1
CAPSULE ORAL
COMMUNITY

## 2025-01-21 RX ORDER — AZITHROMYCIN 250 MG/1
TABLET, FILM COATED ORAL
COMMUNITY
Start: 2024-12-26

## 2025-01-21 NOTE — ASSESSMENT & PLAN NOTE
Patient will undergo EGD.  He is completely asymptomatic so we will move his pantoprazole to every other day

## 2025-01-21 NOTE — PROGRESS NOTES
"Name: Zaheer Cade      : 1950      MRN: [id number]  Encounter Provider: Devan Concepcion MD  Encounter Date: 2025   Encounter department: [facility] [place]  :  Assessment & Plan  Gastroesophageal reflux disease without esophagitis       Patient will undergo EGD.  He is completely asymptomatic so we will move his pantoprazole to every other day  History of colon polyps       Patient will undergo colonoscopy      History of Present Illness   HPI  Zaheer Cade is a 74 y.o. male who presents with a long history of gastroesophageal reflux disease.  Past history of colon polyps.  EGD and colonoscopy last done in 2017.  Patient has rare breakthrough GERD.  For the most part he is doing very well taking pantoprazole every day.  No dysphagia or odynophagia.  No melena or hematochezia.  No chest pain.  No other upper GI symptomatology.  He denies any abdominal pain, changes in bowel habits, change in stool caliber, melena, hematochezia, rectal bleeding, tenesmus, or weight loss.  He has chronic exertional shortness of breath likely from deconditioning.      Review of Systems   Constitutional: Negative.    HENT: Negative.     Eyes: Negative.    Respiratory:  Positive for shortness of breath.    Cardiovascular: Negative.    Gastrointestinal: Negative.    Endocrine: Negative.    Genitourinary: Negative.    Musculoskeletal: Negative.    Skin: Negative.    Allergic/Immunologic: Negative.    Neurological: Negative.    Hematological: Negative.    Psychiatric/Behavioral: Negative.          Objective   /80   Pulse 70   Ht 5' 5\" (1.651 m)   Wt 93.4 kg (206 lb)   SpO2 96%   BMI 34.28 kg/m²      Physical Exam  Constitutional:       Appearance: Normal appearance.      Comments: Central obesity   HENT:      Head: Normocephalic.   Eyes:      Pupils: Pupils are equal, round, and reactive to light.   Cardiovascular:      Rate and Rhythm: Normal rate and regular rhythm.      " Pulses: Normal pulses.      Heart sounds: Normal heart sounds.   Pulmonary:      Effort: Pulmonary effort is normal.      Breath sounds: Normal breath sounds.   Abdominal:      General: Bowel sounds are normal.      Palpations: Abdomen is soft.      Comments: Positive diastases recti.  No abdominal tenderness   Musculoskeletal:         General: Normal range of motion.      Cervical back: Neck supple.   Skin:     General: Skin is warm and dry.   Neurological:      General: No focal deficit present.      Mental Status: He is alert and oriented to person, place, and time.   Psychiatric:         Mood and Affect: Mood normal.         Behavior: Behavior normal.

## 2025-01-21 NOTE — H&P (VIEW-ONLY)
"Name: Zaheer Cade      : 1950      MRN: [id number]  Encounter Provider: Devan Concepcion MD  Encounter Date: 2025   Encounter department: [facility] [place]  :  Assessment & Plan  Gastroesophageal reflux disease without esophagitis       Patient will undergo EGD.  He is completely asymptomatic so we will move his pantoprazole to every other day  History of colon polyps       Patient will undergo colonoscopy      History of Present Illness   HPI  Zaheer Cade is a 74 y.o. male who presents with a long history of gastroesophageal reflux disease.  Past history of colon polyps.  EGD and colonoscopy last done in 2017.  Patient has rare breakthrough GERD.  For the most part he is doing very well taking pantoprazole every day.  No dysphagia or odynophagia.  No melena or hematochezia.  No chest pain.  No other upper GI symptomatology.  He denies any abdominal pain, changes in bowel habits, change in stool caliber, melena, hematochezia, rectal bleeding, tenesmus, or weight loss.  He has chronic exertional shortness of breath likely from deconditioning.      Review of Systems   Constitutional: Negative.    HENT: Negative.     Eyes: Negative.    Respiratory:  Positive for shortness of breath.    Cardiovascular: Negative.    Gastrointestinal: Negative.    Endocrine: Negative.    Genitourinary: Negative.    Musculoskeletal: Negative.    Skin: Negative.    Allergic/Immunologic: Negative.    Neurological: Negative.    Hematological: Negative.    Psychiatric/Behavioral: Negative.          Objective   /80   Pulse 70   Ht 5' 5\" (1.651 m)   Wt 93.4 kg (206 lb)   SpO2 96%   BMI 34.28 kg/m²      Physical Exam  Constitutional:       Appearance: Normal appearance.      Comments: Central obesity   HENT:      Head: Normocephalic.   Eyes:      Pupils: Pupils are equal, round, and reactive to light.   Cardiovascular:      Rate and Rhythm: Normal rate and regular rhythm.      " Pulses: Normal pulses.      Heart sounds: Normal heart sounds.   Pulmonary:      Effort: Pulmonary effort is normal.      Breath sounds: Normal breath sounds.   Abdominal:      General: Bowel sounds are normal.      Palpations: Abdomen is soft.      Comments: Positive diastases recti.  No abdominal tenderness   Musculoskeletal:         General: Normal range of motion.      Cervical back: Neck supple.   Skin:     General: Skin is warm and dry.   Neurological:      General: No focal deficit present.      Mental Status: He is alert and oriented to person, place, and time.   Psychiatric:         Mood and Affect: Mood normal.         Behavior: Behavior normal.

## 2025-01-21 NOTE — PATIENT INSTRUCTIONS
Scheduled date of EGD/colonoscopy (as of today): 1/28/25  Physician performing EGD/colonoscopy: Jamey  Location of EGD/colonoscopy: [place]  Desired bowel prep reviewed with patient: Miralax  Instructions reviewed with patient by: Laura SALDANA  Clearances:

## 2025-01-29 ENCOUNTER — ANESTHESIA (OUTPATIENT)
Dept: GASTROENTEROLOGY | Facility: HOSPITAL | Age: 75
End: 2025-01-29
Payer: MEDICARE

## 2025-01-29 ENCOUNTER — ANESTHESIA EVENT (OUTPATIENT)
Dept: GASTROENTEROLOGY | Facility: HOSPITAL | Age: 75
End: 2025-01-29
Payer: MEDICARE

## 2025-01-29 ENCOUNTER — HOSPITAL ENCOUNTER (OUTPATIENT)
Dept: GASTROENTEROLOGY | Facility: HOSPITAL | Age: 75
Setting detail: OUTPATIENT SURGERY
Discharge: HOME | End: 2025-01-29
Payer: MEDICARE

## 2025-01-29 VITALS
OXYGEN SATURATION: 97 % | DIASTOLIC BLOOD PRESSURE: 56 MMHG | HEART RATE: 63 BPM | TEMPERATURE: 97.6 F | RESPIRATION RATE: 18 BRPM | HEIGHT: 65 IN | WEIGHT: 196.87 LBS | SYSTOLIC BLOOD PRESSURE: 119 MMHG | BODY MASS INDEX: 32.8 KG/M2

## 2025-01-29 DIAGNOSIS — Z86.0100 HISTORY OF COLON POLYPS: ICD-10-CM

## 2025-01-29 DIAGNOSIS — K21.9 GASTROESOPHAGEAL REFLUX DISEASE WITHOUT ESOPHAGITIS: ICD-10-CM

## 2025-01-29 DIAGNOSIS — K21.9 LARYNGOPHARYNGEAL REFLUX (LPR): ICD-10-CM

## 2025-01-29 DIAGNOSIS — R09.A2 GLOBUS SENSATION: ICD-10-CM

## 2025-01-29 PROCEDURE — 88305 TISSUE EXAM BY PATHOLOGIST: CPT | Performed by: PATHOLOGY

## 2025-01-29 PROCEDURE — 43239 EGD BIOPSY SINGLE/MULTIPLE: CPT | Performed by: INTERNAL MEDICINE

## 2025-01-29 PROCEDURE — 45385 COLONOSCOPY W/LESION REMOVAL: CPT | Performed by: INTERNAL MEDICINE

## 2025-01-29 RX ORDER — SODIUM CHLORIDE, SODIUM LACTATE, POTASSIUM CHLORIDE, CALCIUM CHLORIDE 600; 310; 30; 20 MG/100ML; MG/100ML; MG/100ML; MG/100ML
INJECTION, SOLUTION INTRAVENOUS CONTINUOUS PRN
Status: DISCONTINUED | OUTPATIENT
Start: 2025-01-29 | End: 2025-01-29

## 2025-01-29 RX ORDER — LIDOCAINE HYDROCHLORIDE 20 MG/ML
INJECTION, SOLUTION EPIDURAL; INFILTRATION; INTRACAUDAL; PERINEURAL AS NEEDED
Status: DISCONTINUED | OUTPATIENT
Start: 2025-01-29 | End: 2025-01-29

## 2025-01-29 RX ORDER — PROPOFOL 10 MG/ML
INJECTION, EMULSION INTRAVENOUS AS NEEDED
Status: DISCONTINUED | OUTPATIENT
Start: 2025-01-29 | End: 2025-01-29

## 2025-01-29 RX ADMIN — PROPOFOL 150 MG: 10 INJECTION, EMULSION INTRAVENOUS at 07:31

## 2025-01-29 RX ADMIN — PROPOFOL 100 MG: 10 INJECTION, EMULSION INTRAVENOUS at 07:38

## 2025-01-29 RX ADMIN — SODIUM CHLORIDE, SODIUM LACTATE, POTASSIUM CHLORIDE, AND CALCIUM CHLORIDE: .6; .31; .03; .02 INJECTION, SOLUTION INTRAVENOUS at 07:24

## 2025-01-29 RX ADMIN — LIDOCAINE HYDROCHLORIDE 50 MG: 20 INJECTION, SOLUTION EPIDURAL; INFILTRATION; INTRACAUDAL; PERINEURAL at 07:31

## 2025-01-29 NOTE — ANESTHESIA PREPROCEDURE EVALUATION
Procedure:  COLONOSCOPY  EGD    Relevant Problems   CARDIO   (+) Hypertension, essential   (+) Mixed hyperlipidemia      GI/HEPATIC   (+) Gastroesophageal reflux disease without esophagitis      MUSCULOSKELETAL   (+) Chronic right-sided low back pain   (+) Lumbar spondylosis      NEURO/PSYCH   (+) Chronic right-sided low back pain      GERD (gastroesophageal reflux disease)    Hyperlipidemia    Arthritis    Glaucoma    Hypertension    PVC (premature ventricular contraction)    NSVT (nonsustained ventricular tachycardia) (HCC)    IBS (irritable colon syndrome)    CAD (coronary artery disease)    Chronic right-sided low back pain    Nosebleed      Physical Exam    Airway    Mallampati score: II  TM Distance: >3 FB  Neck ROM: full     Dental       Cardiovascular  Cardiovascular exam normal    Pulmonary  Pulmonary exam normal     Other Findings        Anesthesia Plan  ASA Score- 2     Anesthesia Type- IV sedation with anesthesia with ASA Monitors.         Additional Monitors:     Airway Plan:            Plan Factors-Exercise tolerance (METS): >4 METS.    Chart reviewed. EKG reviewed. Imaging results reviewed. Existing labs reviewed. Patient summary reviewed.                  Induction- intravenous.    Postoperative Plan-         Informed Consent- Anesthetic plan and risks discussed with patient.  I personally reviewed this patient with the CRNA. Discussed and agreed on the Anesthesia Plan with the CRNA..      NPO Status:  Vitals Value Taken Time   Date of last liquid 01/29/25 01/29/25 0706   Time of last liquid 0400 01/29/25 0706   Date of last solid 01/27/25 01/29/25 0706   Time of last solid 1800 01/29/25 0706

## 2025-01-29 NOTE — INTERVAL H&P NOTE
H&P reviewed. After examining the patient I find no changes in the patients condition since the H&P had been written.    Vitals:    01/29/25 0708   BP: 166/83   Pulse: 75   Resp: 18   Temp: (!) 97.2 °F (36.2 °C)   SpO2: 93%

## 2025-01-29 NOTE — ANESTHESIA POSTPROCEDURE EVALUATION
Post-Op Assessment Note    CV Status:  Stable  Pain Score: 0    Pain management: adequate       Mental Status:  Sleepy   Hydration Status:  Stable   PONV Controlled:  None   Airway Patency:  Patent     Post Op Vitals Reviewed: Yes    No anethesia notable event occurred.    Staff: CRNA           Last Filed PACU Vitals:  Vitals Value Taken Time   Temp 97.6 °F (36.4 °C) 01/29/25 0752   Pulse 67 01/29/25 0752   /59 01/29/25 0752   Resp 18 01/29/25 0752   SpO2 96 % 01/29/25 0752       Modified Sabine:     Vitals Value Taken Time   Activity 2 01/29/25 0752   Respiration 2 01/29/25 0752   Circulation 2 01/29/25 0752   Consciousness 1 01/29/25 0752   Oxygen Saturation 2 01/29/25 0752     Modified Sabine Score: 9

## 2025-01-30 PROCEDURE — 88305 TISSUE EXAM BY PATHOLOGIST: CPT | Performed by: PATHOLOGY

## 2025-02-07 ENCOUNTER — APPOINTMENT (OUTPATIENT)
Dept: LAB | Facility: HOSPITAL | Age: 75
End: 2025-02-07
Payer: MEDICARE

## 2025-02-07 DIAGNOSIS — I25.10 ATHEROSCLEROSIS OF NATIVE CORONARY ARTERY WITHOUT ANGINA PECTORIS, UNSPECIFIED WHETHER NATIVE OR TRANSPLANTED HEART: ICD-10-CM

## 2025-02-07 LAB
CHOLEST SERPL-MCNC: 142 MG/DL (ref ?–200)
HDLC SERPL-MCNC: 40 MG/DL
LDLC SERPL CALC-MCNC: 85 MG/DL (ref 0–100)
NONHDLC SERPL-MCNC: 102 MG/DL
TRIGL SERPL-MCNC: 86 MG/DL (ref ?–150)

## 2025-02-07 PROCEDURE — 80061 LIPID PANEL: CPT

## 2025-02-07 PROCEDURE — 36415 COLL VENOUS BLD VENIPUNCTURE: CPT

## 2025-02-26 DIAGNOSIS — K21.9 LARYNGOPHARYNGEAL REFLUX (LPR): ICD-10-CM

## 2025-02-26 DIAGNOSIS — K21.9 GASTROESOPHAGEAL REFLUX DISEASE WITHOUT ESOPHAGITIS: ICD-10-CM

## 2025-02-27 RX ORDER — PANTOPRAZOLE SODIUM 40 MG/1
TABLET, DELAYED RELEASE ORAL
Qty: 180 TABLET | Refills: 1 | Status: SHIPPED | OUTPATIENT
Start: 2025-02-27

## 2025-03-07 ENCOUNTER — OFFICE VISIT (OUTPATIENT)
Dept: BARIATRICS | Facility: CLINIC | Age: 75
End: 2025-03-07

## 2025-03-07 VITALS
HEIGHT: 65 IN | HEART RATE: 73 BPM | SYSTOLIC BLOOD PRESSURE: 156 MMHG | DIASTOLIC BLOOD PRESSURE: 80 MMHG | WEIGHT: 197.6 LBS | RESPIRATION RATE: 16 BRPM | BODY MASS INDEX: 32.92 KG/M2

## 2025-03-07 DIAGNOSIS — I10 HYPERTENSION, ESSENTIAL: ICD-10-CM

## 2025-03-07 DIAGNOSIS — E66.811 OBESITY, CLASS I, BMI 30-34.9: Primary | ICD-10-CM

## 2025-03-07 DIAGNOSIS — I25.10 CAD (CORONARY ARTERY DISEASE): ICD-10-CM

## 2025-03-07 DIAGNOSIS — E78.2 MIXED HYPERLIPIDEMIA: ICD-10-CM

## 2025-03-07 RX ORDER — CLINDAMYCIN PHOSPHATE 10 UG/ML
LOTION TOPICAL
COMMUNITY
Start: 2025-01-29

## 2025-03-07 RX ORDER — ASPIRIN 81 MG/1
81 TABLET, CHEWABLE ORAL DAILY
COMMUNITY

## 2025-03-07 NOTE — PATIENT INSTRUCTIONS
- Discussed options of HealthyCORE-Intensive Lifestyle Intervention Program, Very Low Calorie Diet-VLCD, and Conservative Program and the role of weight loss medications.  - Patient is interested in pursuing Conservative Program and follow up visits with medical weight management provider.  - Explained the importance of making lifestyle changes in addition to starting any anti-obesity medications.   - Initial weight loss goal of 5-10% weight loss for improved health. Weight loss can improve patient's co-morbid conditions and/or prevent weight-related complications.  - Weight is not at goal and patient has been unable to achieve a meaningful weight loss above 5% using various programs and tools for more than 6 months  - Labs reviewed.     General Recommendations:  Nutrition:  Eat breakfast daily.  Do not skip meals.      Food log (ie.) www.[employer].com, sparkpeople.com, loseit.com, calorieking.com, etc.     Practice mindful eating.  Be sure to set aside time to eat, eat slowly, and savor your food.     Hydration:    At least 64oz of water daily.  No sugar sweetened beverages.  No juice (eat the fruit instead).     Exercise:  Studies have shown that the ideal exercise goal is somewhere between 150 to 300 minutes of moderate intensity exercise a week.  Start with exercising 10 minutes every other day and gradually increase physical activity with a goal of at least 150 minutes of moderate intensity exercise a week, divided over at least 3 days a week.  An example of this would be exercising 30 minutes a day, 5 days a week.  Resistance training can increase muscle mass and increase our resting metabolic rate.   FULL BODY resistance training is recommended 2-3 times a week.  Do not do this on consecutive days to allow for muscle recovery.     Aim for a bare minimum 5000 steps, even on days you do not exercise.     Monitoring:   Weigh yourself daily.  If this causes undue stress, then just weigh yourself once a week.   Weigh yourself the same time of the day with the same amount of clothing on.  Preferably this should be done after waking up, before you eat, and with no clothing or minimal clothing on.     Specific Goals:  Calorie goal:  1800 tu/day (Provided with meal plan to follow)    Patient denies personal history of pancreatitis. Patient also denies personal and family history of medullary thyroid cancer and multiple endocrine neoplasia type 2 (MEN 2 tumor).     Patient understands the side effects of the medication and proper administration. Patient agrees with the treatment plan and all questions were answered.

## 2025-03-07 NOTE — PROGRESS NOTES
Assessment/Plan:    Obesity, Class I, BMI 30-34.9  - Discussed options of [facility]-Intensive Lifestyle Intervention Program, Very Low Calorie Diet-VLCD, and Conservative Program and the role of weight loss medications.  - Patient is interested in pursuing Conservative Program and follow up visits with medical weight management provider.  - Explained the importance of making lifestyle changes in addition to starting any anti-obesity medications.   - Initial weight loss goal of 5-10% weight loss for improved health. Weight loss can improve patient's co-morbid conditions and/or prevent weight-related complications.  - Weight is not at goal and patient has been unable to achieve a meaningful weight loss above 5% using various programs and tools for more than 6 months  - Labs reviewed.     General Recommendations:  Nutrition:  Eat breakfast daily.  Do not skip meals.      Food log (ie.) www.[employer].com, sparkpeople.com, loseit.com, calorieking.com, etc.     Practice mindful eating.  Be sure to set aside time to eat, eat slowly, and savor your food.     Hydration:    At least 64oz of water daily.  No sugar sweetened beverages.  No juice (eat the fruit instead).     Exercise:  Studies have shown that the ideal exercise goal is somewhere between 150 to 300 minutes of moderate intensity exercise a week.  Start with exercising 10 minutes every other day and gradually increase physical activity with a goal of at least 150 minutes of moderate intensity exercise a week, divided over at least 3 days a week.  An example of this would be exercising 30 minutes a day, 5 days a week.  Resistance training can increase muscle mass and increase our resting metabolic rate.   FULL BODY resistance training is recommended 2-3 times a week.  Do not do this on consecutive days to allow for muscle recovery.     Aim for a bare minimum 5000 steps, even on days you do not exercise.     Monitoring:   Weigh yourself daily.  If this causes  undue stress, then just weigh yourself once a week.  Weigh yourself the same time of the day with the same amount of clothing on.  Preferably this should be done after waking up, before you eat, and with no clothing or minimal clothing on.     Specific Goals:  Calorie goal:  1800 tu/day (Provided with meal plan to follow)    Patient denies personal history of pancreatitis. Patient also denies personal and family history of medullary thyroid cancer and multiple endocrine neoplasia type 2 (MEN 2 tumor).     Patient understands the side effects of the medication and proper administration. Patient agrees with the treatment plan and all questions were answered.           Al was seen today for consult.    Diagnoses and all orders for this visit:    Obesity, Class I, BMI 30-34.9    Hypertension, essential    Mixed hyperlipidemia    CAD (coronary artery disease)           Total time spent reviewing chart, interviewing patient, examining patient, discussing plan, answering all questions, and documentin min, with >50% face-to-face time spent counseling patient on nonsurgical interventions for the treatment of excess weight. Discussed in detail nonsurgical options including intensive lifestyle intervention program, very low-calorie diet program and conservative program.  Discussed the role of weight loss medications.  Counseled patient on diet behavior and exercise modification for weight loss.    Follow up in approximately 3 months with Non-Surgical Physician/Advanced Practitioner.    Subjective:   Chief Complaint   Patient presents with    Consult     Initial visit with johanna       Patient ID: Zaheer Cade  is a 74 y.o. male with excess weight/obesity here to pursue weight management.  Previous notes and records have been reviewed. Patient wants to lose 20 lbs for golfing. Will discuss programs with significant other before deciding. Interested in [facility].     Past Medical History:   Diagnosis Date     Arthritis     CAD (coronary artery disease)     Chronic right-sided low back pain     GERD (gastroesophageal reflux disease)     Glaucoma     Hyperlipidemia     Hypertension     IBS (irritable colon syndrome)     Nosebleed     NSVT (nonsustained ventricular tachycardia) ([facility])     PVC (premature ventricular contraction)      Past Surgical History:   Procedure Laterality Date    CARDIAC CATHETERIZATION  03/14/2018    [facility]    CERVICAL SPINE SURGERY  2020    at [facility]    COLONOSCOPY      EPIDURAL BLOCK INJECTION  Oct-nov-dec2021    FL DISCOGRAM LUMBAR  6/29/2022    LAMINECTOMY      NASAL SEPTUM SURGERY      IL ESOPHAGOGASTRODUODENOSCOPY TRANSORAL DIAGNOSTIC N/A 08/07/2017    Procedure: EGD AND COLONOSCOPY;  Surgeon: Devan Concepcion MD;  Location: [facility];  Service: Gastroenterology    TONSILLECTOMY         HPI:  Wt Readings from Last 20 Encounters:   03/07/25 89.6 kg (197 lb 9.6 oz)   01/29/25 89.3 kg (196 lb 13.9 oz)   01/21/25 93.4 kg (206 lb)   12/26/24 94.3 kg (207 lb 14.3 oz)   02/06/24 88.1 kg (194 lb 3.2 oz)   01/30/24 88.5 kg (195 lb)   09/09/22 88.3 kg (194 lb 9.6 oz)   09/01/22 88 kg (194 lb)   06/03/21 84.4 kg (186 lb)   05/27/21 84.4 kg (186 lb)   05/26/21 84.4 kg (186 lb)   11/10/20 87.7 kg (193 lb 6.4 oz)   11/19/19 84.8 kg (187 lb)   09/17/19 84.8 kg (187 lb)   08/20/19 84.8 kg (187 lb)   01/31/19 84.8 kg (187 lb)   08/08/18 85.3 kg (188 lb)   07/27/18 85.3 kg (188 lb)   07/18/18 85.3 kg (188 lb)   02/14/18 88.5 kg (195 lb 3.2 oz)       Patient presents today to medical weight management office for consult.      Starting MWM weight: 197 lbs (3/7/2025)  Starting BMI: 32.64 (3/7/2025)  Goal weight: wants to lose 20 lbs       Obesity/Excess Weight:  Severity: Moderate  Onset:  adulthood    Modifiers: Diet and Exercise  Contributing factors: Poor Food Choices, Insufficient Physical Activity, Lack of knowledge of appropriate lifestyle changes, and Insufficient time to make appropriate lifestyle  "changes  Associated symptoms: comorbid conditions, fatigue, increased joint pain, body image issues, increased shortness of breath, inability to do certain activities, and clothes do not fit      Pertinent PMHx: HTN, CAD, hyperlipidemia      Diet recall:  B: skips breakfast  L: sandwich  D: salad, seafood  S: typically sweets and ice cream at night; crackers throughout the day  Take out frequency: none right now     Hydration: pepsi zero, V8 Juice, propel  Alcohol: socially  Smoking: none  Exercise: 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    Family History   Problem Relation Age of Onset    Heart disease Mother     Dementia Father     Cancer Father     Heart disease Father         Review of Systems   Constitutional:  Negative for chills and fever.   HENT:  Negative for ear pain and sore throat.    Eyes:  Negative for pain and visual disturbance.   Respiratory:  Negative for cough and shortness of breath.    Cardiovascular:  Negative for chest pain and palpitations.   Gastrointestinal:  Negative for abdominal pain and vomiting.   Genitourinary:  Negative for dysuria and hematuria.   Musculoskeletal:  Negative for arthralgias and back pain.   Skin:  Negative for color change and rash.   Neurological:  Negative for seizures and syncope.   All other systems reviewed and are negative.      Objective:  /80 (BP Location: Left arm, Patient Position: Sitting, Cuff Size: Large)   Pulse 73   Resp 16   Ht 5' 5.24\" (1.657 m)   Wt 89.6 kg (197 lb 9.6 oz)   BMI 32.64 kg/m²     Physical Exam  Constitutional:       General: He is not in acute distress.     Appearance: Normal " "appearance. He is obese. He is not ill-appearing, toxic-appearing or diaphoretic.   HENT:      Head: Normocephalic and atraumatic.      Mouth/Throat:      Mouth: Mucous membranes are moist.   Eyes:      Extraocular Movements: Extraocular movements intact.   Pulmonary:      Effort: Pulmonary effort is normal.   Musculoskeletal:         General: Normal range of motion.      Cervical back: Normal range of motion.   Skin:     General: Skin is warm.   Neurological:      Mental Status: He is alert and oriented to person, place, and time.   Psychiatric:         Mood and Affect: Mood normal.         Behavior: Behavior normal.         Thought Content: Thought content normal.              Labs and Imaging  Recent labs and imaging have been personally reviewed.  Lab Results   Component Value Date    WBC 13.12 (H) 12/26/2024    HGB 14.6 12/26/2024    HCT 44.7 12/26/2024    MCV 90 12/26/2024     12/26/2024     Lab Results   Component Value Date    SODIUM 139 12/26/2024    K 3.8 12/26/2024     12/26/2024    CO2 22 12/26/2024    AGAP 9 12/26/2024    BUN 17 12/26/2024    CREATININE 0.76 12/26/2024    GLUC 128 12/26/2024    GLUF 114 (H) 01/24/2024    CALCIUM 9.2 12/26/2024    AST 16 12/26/2024    ALT 22 12/26/2024    ALKPHOS 81 12/26/2024    TP 7.0 12/26/2024    TBILI 0.62 12/26/2024    EGFR 88 12/26/2024     No results found for: \"HGBA1C\"  Lab Results   Component Value Date    MNU8WEVKORDE 1.040 01/24/2024    TSH 0.82 08/05/2022     Lab Results   Component Value Date    CHOLESTEROL 142 02/07/2025     Lab Results   Component Value Date    HDL 40 02/07/2025     Lab Results   Component Value Date    TRIG 86 02/07/2025     Lab Results   Component Value Date    LDLCALC 85 02/07/2025           Weight Management  Meet Slaughter PA-C    "

## 2025-03-07 NOTE — ASSESSMENT & PLAN NOTE
- Discussed options of [facility]-Intensive Lifestyle Intervention Program, Very Low Calorie Diet-VLCD, and Conservative Program and the role of weight loss medications.  - Patient is interested in pursuing Conservative Program and follow up visits with medical weight management provider.  - Explained the importance of making lifestyle changes in addition to starting any anti-obesity medications.   - Initial weight loss goal of 5-10% weight loss for improved health. Weight loss can improve patient's co-morbid conditions and/or prevent weight-related complications.  - Weight is not at goal and patient has been unable to achieve a meaningful weight loss above 5% using various programs and tools for more than 6 months  - Labs reviewed.     General Recommendations:  Nutrition:  Eat breakfast daily.  Do not skip meals.      Food log (ie.) www.[employer].com, sparkpeople.com, loseit.com, calorieking.com, etc.     Practice mindful eating.  Be sure to set aside time to eat, eat slowly, and savor your food.     Hydration:    At least 64oz of water daily.  No sugar sweetened beverages.  No juice (eat the fruit instead).     Exercise:  Studies have shown that the ideal exercise goal is somewhere between 150 to 300 minutes of moderate intensity exercise a week.  Start with exercising 10 minutes every other day and gradually increase physical activity with a goal of at least 150 minutes of moderate intensity exercise a week, divided over at least 3 days a week.  An example of this would be exercising 30 minutes a day, 5 days a week.  Resistance training can increase muscle mass and increase our resting metabolic rate.   FULL BODY resistance training is recommended 2-3 times a week.  Do not do this on consecutive days to allow for muscle recovery.     Aim for a bare minimum 5000 steps, even on days you do not exercise.     Monitoring:   Weigh yourself daily.  If this causes undue stress, then just weigh yourself once a week.   Weigh yourself the same time of the day with the same amount of clothing on.  Preferably this should be done after waking up, before you eat, and with no clothing or minimal clothing on.     Specific Goals:  Calorie goal:  1800 tu/day (Provided with meal plan to follow)    Patient denies personal history of pancreatitis. Patient also denies personal and family history of medullary thyroid cancer and multiple endocrine neoplasia type 2 (MEN 2 tumor).     Patient understands the side effects of the medication and proper administration. Patient agrees with the treatment plan and all questions were answered.

## 2025-04-19 ENCOUNTER — HOSPITAL ENCOUNTER (EMERGENCY)
Facility: HOSPITAL | Age: 75
Discharge: HOME | End: 2025-04-19
Attending: EMERGENCY MEDICINE
Payer: MEDICARE

## 2025-04-19 ENCOUNTER — APPOINTMENT (EMERGENCY)
Dept: RADIOLOGY | Facility: HOSPITAL | Age: 75
End: 2025-04-19
Payer: MEDICARE

## 2025-04-19 VITALS
BODY MASS INDEX: 33.25 KG/M2 | WEIGHT: 201.28 LBS | TEMPERATURE: 98.4 F | SYSTOLIC BLOOD PRESSURE: 143 MMHG | OXYGEN SATURATION: 92 % | HEART RATE: 60 BPM | DIASTOLIC BLOOD PRESSURE: 71 MMHG | RESPIRATION RATE: 16 BRPM

## 2025-04-19 DIAGNOSIS — R07.9 CHEST PAIN: Primary | ICD-10-CM

## 2025-04-19 LAB
ANION GAP SERPL CALCULATED.3IONS-SCNC: 6 MMOL/L (ref 4–13)
BASOPHILS # BLD AUTO: 0.01 THOUSANDS/ÂΜL (ref 0–0.1)
BASOPHILS NFR BLD AUTO: 0 % (ref 0–1)
BUN SERPL-MCNC: 24 MG/DL (ref 5–25)
CALCIUM SERPL-MCNC: 8.8 MG/DL (ref 8.4–10.2)
CARDIAC TROPONIN I PNL SERPL HS: 4 NG/L (ref ?–50)
CHLORIDE SERPL-SCNC: 113 MMOL/L (ref 96–108)
CO2 SERPL-SCNC: 24 MMOL/L (ref 21–32)
CREAT SERPL-MCNC: 0.94 MG/DL (ref 0.6–1.3)
EOSINOPHIL # BLD AUTO: 0.08 THOUSAND/ÂΜL (ref 0–0.61)
EOSINOPHIL NFR BLD AUTO: 1 % (ref 0–6)
ERYTHROCYTE [DISTWIDTH] IN BLOOD BY AUTOMATED COUNT: 13.2 % (ref 11.6–15.1)
GFR SERPL CREATININE-BSD FRML MDRD: 78 ML/MIN/1.73SQ M
GLUCOSE SERPL-MCNC: 119 MG/DL (ref 65–140)
HCT VFR BLD AUTO: 39.8 % (ref 36.5–49.3)
HGB BLD-MCNC: 12.7 G/DL (ref 12–17)
IMM GRANULOCYTES # BLD AUTO: 0.02 THOUSAND/UL (ref 0–0.2)
IMM GRANULOCYTES NFR BLD AUTO: 0 % (ref 0–2)
LYMPHOCYTES # BLD AUTO: 1.24 THOUSANDS/ÂΜL (ref 0.6–4.47)
LYMPHOCYTES NFR BLD AUTO: 19 % (ref 14–44)
MCH RBC QN AUTO: 29.5 PG (ref 26.8–34.3)
MCHC RBC AUTO-ENTMCNC: 31.9 G/DL (ref 31.4–37.4)
MCV RBC AUTO: 92 FL (ref 82–98)
MONOCYTES # BLD AUTO: 0.46 THOUSAND/ÂΜL (ref 0.17–1.22)
MONOCYTES NFR BLD AUTO: 7 % (ref 4–12)
NEUTROPHILS # BLD AUTO: 4.61 THOUSANDS/ÂΜL (ref 1.85–7.62)
NEUTS SEG NFR BLD AUTO: 73 % (ref 43–75)
NRBC BLD AUTO-RTO: 0 /100 WBCS
PLATELET # BLD AUTO: 224 THOUSANDS/UL (ref 149–390)
PMV BLD AUTO: 10.7 FL (ref 8.9–12.7)
POTASSIUM SERPL-SCNC: 3.5 MMOL/L (ref 3.5–5.3)
RBC # BLD AUTO: 4.31 MILLION/UL (ref 3.88–5.62)
SODIUM SERPL-SCNC: 143 MMOL/L (ref 135–147)
WBC # BLD AUTO: 6.42 THOUSAND/UL (ref 4.31–10.16)

## 2025-04-19 PROCEDURE — 96360 HYDRATION IV INFUSION INIT: CPT

## 2025-04-19 PROCEDURE — 93005 ELECTROCARDIOGRAM TRACING: CPT

## 2025-04-19 PROCEDURE — 80048 BASIC METABOLIC PNL TOTAL CA: CPT | Performed by: EMERGENCY MEDICINE

## 2025-04-19 PROCEDURE — 99284 EMERGENCY DEPT VISIT MOD MDM: CPT | Performed by: EMERGENCY MEDICINE

## 2025-04-19 PROCEDURE — 36415 COLL VENOUS BLD VENIPUNCTURE: CPT | Performed by: EMERGENCY MEDICINE

## 2025-04-19 PROCEDURE — 84484 ASSAY OF TROPONIN QUANT: CPT | Performed by: EMERGENCY MEDICINE

## 2025-04-19 PROCEDURE — 71045 X-RAY EXAM CHEST 1 VIEW: CPT

## 2025-04-19 PROCEDURE — 99285 EMERGENCY DEPT VISIT HI MDM: CPT

## 2025-04-19 PROCEDURE — 85025 COMPLETE CBC W/AUTO DIFF WBC: CPT | Performed by: EMERGENCY MEDICINE

## 2025-04-19 RX ADMIN — SODIUM CHLORIDE 1000 ML: 0.9 INJECTION, SOLUTION INTRAVENOUS at 14:47

## 2025-04-19 NOTE — ED PROVIDER NOTES
Time reflects when diagnosis was documented in both MDM as applicable and the Disposition within this note       Time User Action Codes Description Comment    4/19/2025  2:33 PM Pebbles Moralesn Add [R07.9] Chest pain           ED Disposition       ED Disposition   Discharge    Condition   Stable    Date/Time   Sat Apr 19, 2025  3:27 PM    Comment   Zaheer Cade discharge to home/self care.                   Assessment & Plan       Medical Decision Making  74-year-old male history of hypertension and CAD on aspirin presenting with chest pain.  Plan for cardiac evaluation including EKG troponin plus chest x-ray.  IV fluids.  Reassess.    Heart score EKG interpreted by me with normal sinus rhythm and nonspecific ST abnormality.  Labs interpreted by me without significant acute process.  Chest x-ray interpreted by me without significant acute cardiopulmonary process.  Remains asymptomatic. Discussed results and recommendations. Advised follow up PCP and cardiology. Medication recommendations. Given instructions and return precautions. Patient/family at bedside acknowledged understanding of all written and verbal instructions and return precautions. Discharged.     Amount and/or Complexity of Data Reviewed  Labs: ordered.  Radiology: ordered.             Medications   sodium chloride 0.9 % bolus 1,000 mL (0 mL Intravenous Stopped 4/19/25 1553)       ED Risk Strat Scores                    No data recorded        SBIRT 20yo+      Flowsheet Row Most Recent Value   Initial Alcohol Screen: US AUDIT-C     1. How often do you have a drink containing alcohol? 0 Filed at: 04/19/2025 1416   2. How many drinks containing alcohol do you have on a typical day you are drinking?  0 Filed at: 04/19/2025 1416   3b. FEMALE Any Age, or MALE 65+: How often do you have 4 or more drinks on one occassion? 0 Filed at: 04/19/2025 1416   Audit-C Score 0 Filed at: 04/19/2025 1416   MORENO: How many times in the past year have you...    Used an illegal  drug or used a prescription medication for non-medical reasons? Never Filed at: 2025 1416                            History of Present Illness       Chief Complaint   Patient presents with    Chest Pain     Pt c/o mid sternal chest pain since yesterday, pt reports doing yard work, hx of cardiac stent 3-4 years ago.        Past Medical History:   Diagnosis Date    Arthritis     CAD (coronary artery disease)     Chronic right-sided low back pain     GERD (gastroesophageal reflux disease)     Glaucoma     Hyperlipidemia     Hypertension     IBS (irritable colon syndrome)     Nosebleed     NSVT (nonsustained ventricular tachycardia) (HCC)     PVC (premature ventricular contraction)       Past Surgical History:   Procedure Laterality Date    CARDIAC CATHETERIZATION  2018    [facility]    CERVICAL SPINE SURGERY      at [facility]    COLONOSCOPY      EPIDURAL BLOCK INJECTION  Oct-nov-dec2021    FL DISCOGRAM LUMBAR  2022    LAMINECTOMY      NASAL SEPTUM SURGERY      MT ESOPHAGOGASTRODUODENOSCOPY TRANSORAL DIAGNOSTIC N/A 2017    Procedure: EGD AND COLONOSCOPY;  Surgeon: Devan Concepcion MD;  Location: [facility];  Service: Gastroenterology    TONSILLECTOMY        Family History   Problem Relation Age of Onset    Heart disease Mother     Dementia Father     Cancer Father     Heart disease Father       Social History     Tobacco Use    Smoking status: Former     Current packs/day: 0.00     Average packs/day: 0.5 packs/day for 20.0 years (10.0 ttl pk-yrs)     Types: Cigarettes     Start date: 1969     Quit date: 1989     Years since quittin.3    Smokeless tobacco: Never   Vaping Use    Vaping status: Never Used   Substance Use Topics    Alcohol use: Yes     Comment: Occasional drinker    Drug use: No      E-Cigarette/Vaping    E-Cigarette Use Never User       E-Cigarette/Vaping Substances    Nicotine No     THC No     CBD No     Flavoring No     Other No     Unknown No       I have reviewed and  agree with the history as documented.     74-year-old male history of hypertension and CAD on aspirin presenting with chest pain.  Patient reports intermittent chest pain over the last couple of days.  Did have exertional component yesterday with moving mulch.  No exertional component today.  Reports associated lightheadedness today.  Denies any association with  diaphoresis, nausea/vomiting, syncope, radiation.  Currently asymptomatic.  Patient reports after the episode today he checked his blood pressure and found that his blood pressure was okay but the machine calculated his heart rate in the 150s.  Denies any known history of abnormal heart rhythm but did reduce his dose of verapamil last week per cardiology's recommendation.  Denies any shortness of breath.  Denies any other complaints.  Chart reviewed.    Past Medical History:  No date: Arthritis  No date: CAD (coronary artery disease)  No date: Chronic right-sided low back pain  No date: GERD (gastroesophageal reflux disease)  No date: Glaucoma  No date: Hyperlipidemia  No date: Hypertension  No date: IBS (irritable colon syndrome)  No date: Nosebleed  No date: NSVT (nonsustained ventricular tachycardia) (HCC)  No date: PVC (premature ventricular contraction)  Family History: non-contributory  Social History          Review of Systems   Constitutional:  Negative for appetite change, chills, diaphoresis, fever and unexpected weight change.   HENT:  Negative for congestion and rhinorrhea.    Eyes:  Negative for photophobia and visual disturbance.   Respiratory:  Negative for cough, chest tightness and shortness of breath.    Cardiovascular:  Positive for chest pain. Negative for palpitations and leg swelling.   Gastrointestinal:  Negative for abdominal distention, abdominal pain, blood in stool, constipation, diarrhea, nausea and vomiting.   Genitourinary:  Negative for dysuria and hematuria.   Musculoskeletal:  Negative for back pain, joint swelling, neck  pain and neck stiffness.   Skin:  Negative for color change, pallor, rash and wound.   Neurological:  Negative for dizziness, syncope, weakness, light-headedness and headaches.   Psychiatric/Behavioral:  Negative for agitation.    All other systems reviewed and are negative.          Objective       ED Triage Vitals [04/19/25 1413]   Temperature Pulse Blood Pressure Respirations SpO2 Patient Position - Orthostatic VS   98.4 °F (36.9 °C) 83 154/72 18 96 % Sitting      Temp Source Heart Rate Source BP Location FiO2 (%) Pain Score    Temporal Monitor Left arm -- --      Vitals      Date and Time Temp Pulse SpO2 Resp BP Pain Score FACES Pain Rating User   04/19/25 1530 -- 60 92 % 16 143/71 -- -- JK   04/19/25 1413 98.4 °F (36.9 °C) 83 96 % 18 154/72 -- -- LA            Physical Exam  Vitals and nursing note reviewed.   Constitutional:       General: He is not in acute distress.     Appearance: Normal appearance. He is well-developed. He is not ill-appearing, toxic-appearing or diaphoretic.   HENT:      Head: Normocephalic and atraumatic.      Nose: Nose normal. No congestion or rhinorrhea.      Mouth/Throat:      Mouth: Mucous membranes are moist.      Pharynx: Oropharynx is clear. No oropharyngeal exudate or posterior oropharyngeal erythema.   Eyes:      General: No scleral icterus.        Right eye: No discharge.         Left eye: No discharge.      Extraocular Movements: Extraocular movements intact.      Conjunctiva/sclera: Conjunctivae normal.      Pupils: Pupils are equal, round, and reactive to light.   Neck:      Vascular: No JVD.      Trachea: No tracheal deviation.      Comments: Supple. Normal range of motion.   Cardiovascular:      Rate and Rhythm: Normal rate and regular rhythm.      Heart sounds: Normal heart sounds. No murmur heard.     No friction rub. No gallop.      Comments: Normal rate and regular rhythm  Pulmonary:      Effort: Pulmonary effort is normal. No respiratory distress.      Breath sounds:  Normal breath sounds. No stridor. No wheezing or rales.      Comments: Clear to auscultation bilaterally  Chest:      Chest wall: No tenderness.   Abdominal:      General: Bowel sounds are normal. There is no distension.      Palpations: Abdomen is soft.      Tenderness: There is no abdominal tenderness. There is no right CVA tenderness, left CVA tenderness, guarding or rebound.      Comments: Soft, nontender, nondistended.  Normal bowel sounds throughout   Musculoskeletal:         General: No swelling, tenderness, deformity or signs of injury. Normal range of motion.      Cervical back: Normal range of motion and neck supple. No rigidity. No muscular tenderness.      Right lower leg: No edema.      Left lower leg: No edema.   Lymphadenopathy:      Cervical: No cervical adenopathy.   Skin:     General: Skin is warm and dry.      Coloration: Skin is not pale.      Findings: No erythema or rash.   Neurological:      General: No focal deficit present.      Mental Status: He is alert. Mental status is at baseline.      Sensory: No sensory deficit.      Motor: No weakness or abnormal muscle tone.      Coordination: Coordination normal.      Gait: Gait normal.      Comments: Alert.  Strength and sensation grossly intact.  Ambulatory without difficulty at baseline.    Psychiatric:         Behavior: Behavior normal.         Thought Content: Thought content normal.         Results Reviewed       Procedure Component Value Units Date/Time    HS Troponin 0hr (reflex protocol) [[id number]]  (Normal) Collected: 04/19/25 1446    Lab Status: Final result Specimen: Blood from Arm, Right Updated: 04/19/25 1527     hs TnI 0hr 4 ng/L     Basic metabolic panel [[id number]]  (Abnormal) Collected: 04/19/25 1446    Lab Status: Final result Specimen: Blood from Arm, Right Updated: 04/19/25 1520     Sodium 143 mmol/L      Potassium 3.5 mmol/L      Chloride 113 mmol/L      CO2 24 mmol/L      ANION GAP 6 mmol/L      BUN 24 mg/dL      Creatinine  0.94 mg/dL      Glucose 119 mg/dL      Calcium 8.8 mg/dL      eGFR 78 ml/min/1.73sq m     Narrative:      [employer] guidelines for Chronic Kidney Disease (CKD):     Stage 1 with normal or high GFR (GFR > 90 mL/min/1.73 square meters)    Stage 2 Mild CKD (GFR = 60-89 mL/min/1.73 square meters)    Stage 3A Moderate CKD (GFR = 45-59 mL/min/1.73 square meters)    Stage 3B Moderate CKD (GFR = 30-44 mL/min/1.73 square meters)    Stage 4 Severe CKD (GFR = 15-29 mL/min/1.73 square meters)    Stage 5 End Stage CKD (GFR <15 mL/min/1.73 square meters)  Note: GFR calculation is accurate only with a steady state creatinine    CBC and differential [[id number]] Collected: 04/19/25 1446    Lab Status: Final result Specimen: Blood from Arm, Right Updated: 04/19/25 1501     WBC 6.42 Thousand/uL      RBC 4.31 Million/uL      Hemoglobin 12.7 g/dL      Hematocrit 39.8 %      MCV 92 fL      MCH 29.5 pg      MCHC 31.9 g/dL      RDW 13.2 %      MPV 10.7 fL      Platelets 224 Thousands/uL      nRBC 0 /100 WBCs      Segmented % 73 %      Immature Grans % 0 %      Lymphocytes % 19 %      Monocytes % 7 %      Eosinophils Relative 1 %      Basophils Relative 0 %      Absolute Neutrophils 4.61 Thousands/µL      Absolute Immature Grans 0.02 Thousand/uL      Absolute Lymphocytes 1.24 Thousands/µL      Absolute Monocytes 0.46 Thousand/µL      Eosinophils Absolute 0.08 Thousand/µL      Basophils Absolute 0.01 Thousands/µL             XR chest 1 view portable    (Results Pending)       Procedures    ED Medication and Procedure Management   Prior to Admission Medications   Prescriptions Last Dose Informant Patient Reported? Taking?   Restasis 0.05 % ophthalmic emulsion  Self Yes No   Sig: INSTILL 1 DROP INTO BOTH EYES TWICE A DAY   Patient not taking: Reported on 1/21/2025   VERAPAMIL HCL PO  Self Yes No   Sig: Take 180 mg by mouth 2 (two) times a day   Patient not taking: Reported on 1/21/2025   amoxicillin (AMOXIL) 500 mg  capsule   Yes No   Patient not taking: Reported on 1/21/2025   aspirin 325 mg tablet  Self Yes No   Sig: Take 325 mg by mouth   aspirin 81 mg chewable tablet  Self Yes No   Sig: Chew 81 mg daily   atorvastatin (LIPITOR) 80 mg tablet  Self Yes No   Sig: atorvastatin 80 mg tablet   TAKE 1 TABLET (80 MG TOTAL) BY MOUTH EVERY MORNING.   azithromycin (ZITHROMAX) 250 mg tablet   Yes No   clindamycin (CLEOCIN T) 1 % lotion   Yes No   Sig: APPLY TWICE A DAY TO AFFECTED AREA ON NECK AND SCALP   diclofenac (VOLTAREN) 75 mg EC tablet  Self Yes No   Sig: Take 75 mg by mouth 2 (two) times a day   pantoprazole (PROTONIX) 40 mg tablet   No No   Sig: TAKE 1 TABLET BY MOUTH 2X A DAY (1TAB 30-60 MIN BEFORE BREAKFAST AND 1TAB 30-60 MIN BEFORE DINNER)   verapamil (CALAN-SR) 180 mg CR tablet   Yes No   Sig: Take 1 tablet by mouth 2 (two) times a day      Facility-Administered Medications: None     Discharge Medication List as of 4/19/2025  3:31 PM        CONTINUE these medications which have NOT CHANGED    Details   amoxicillin (AMOXIL) 500 mg capsule Historical Med      aspirin 325 mg tablet Take 325 mg by mouth, Historical Med      aspirin 81 mg chewable tablet Chew 81 mg daily, Historical Med      atorvastatin (LIPITOR) 80 mg tablet atorvastatin 80 mg tablet   TAKE 1 TABLET (80 MG TOTAL) BY MOUTH EVERY MORNING., Historical Med      azithromycin (ZITHROMAX) 250 mg tablet Historical Med      clindamycin (CLEOCIN T) 1 % lotion APPLY TWICE A DAY TO AFFECTED AREA ON NECK AND SCALP, Historical Med      diclofenac (VOLTAREN) 75 mg EC tablet Take 75 mg by mouth 2 (two) times a day, Historical Med      pantoprazole (PROTONIX) 40 mg tablet TAKE 1 TABLET BY MOUTH 2X A DAY (1TAB 30-60 MIN BEFORE BREAKFAST AND 1TAB 30-60 MIN BEFORE DINNER), Normal      Restasis 0.05 % ophthalmic emulsion INSTILL 1 DROP INTO BOTH EYES TWICE A DAY, Historical Med      verapamil (CALAN-SR) 180 mg CR tablet Take 1 tablet by mouth 2 (two) times a day, Starting Thu  11/28/2024, Historical Med      VERAPAMIL HCL PO Take 180 mg by mouth 2 (two) times a day, Starting Tue 7/17/2018, Historical Med             ED SEPSIS DOCUMENTATION   Time reflects when diagnosis was documented in both MDM as applicable and the Disposition within this note       Time User Action Codes Description Comment    4/19/2025  2:33 PM Felipe Morales Add [R07.9] Chest pain                  Felipe Morales MD  04/19/25 1799

## 2025-04-20 LAB
ATRIAL RATE: 79 BPM
P AXIS: 42 DEGREES
PR INTERVAL: 180 MS
QRS AXIS: 40 DEGREES
QRSD INTERVAL: 78 MS
QT INTERVAL: 368 MS
QTC INTERVAL: 421 MS
T WAVE AXIS: 4 DEGREES
VENTRICULAR RATE: 79 BPM

## 2025-04-20 PROCEDURE — 93010 ELECTROCARDIOGRAM REPORT: CPT | Performed by: STUDENT IN AN ORGANIZED HEALTH CARE EDUCATION/TRAINING PROGRAM

## 2025-08-22 ENCOUNTER — TELEPHONE (OUTPATIENT)
Age: 75
End: 2025-08-22

## 2025-08-22 DIAGNOSIS — K21.9 LARYNGOPHARYNGEAL REFLUX (LPR): ICD-10-CM

## 2025-08-22 DIAGNOSIS — K21.9 GASTROESOPHAGEAL REFLUX DISEASE WITHOUT ESOPHAGITIS: ICD-10-CM

## 2025-08-22 RX ORDER — PANTOPRAZOLE SODIUM 40 MG/1
TABLET, DELAYED RELEASE ORAL
Qty: 180 TABLET | Refills: 1 | Status: SHIPPED | OUTPATIENT
Start: 2025-08-22